# Patient Record
Sex: MALE | Race: WHITE | NOT HISPANIC OR LATINO | Employment: FULL TIME
[De-identification: names, ages, dates, MRNs, and addresses within clinical notes are randomized per-mention and may not be internally consistent; named-entity substitution may affect disease eponyms.]

---

## 2017-01-30 ENCOUNTER — SURGERY (OUTPATIENT)
Age: 57
End: 2017-01-30

## 2017-01-30 RX ADMIN — FENTANYL CITRATE 100 MCG: 50 INJECTION, SOLUTION INTRAMUSCULAR; INTRAVENOUS at 10:06

## 2017-01-30 RX ADMIN — MIDAZOLAM HYDROCHLORIDE 1 MG: 1 INJECTION, SOLUTION INTRAMUSCULAR; INTRAVENOUS at 10:08

## 2017-01-30 RX ADMIN — MIDAZOLAM HYDROCHLORIDE 2 MG: 1 INJECTION, SOLUTION INTRAMUSCULAR; INTRAVENOUS at 10:06

## 2017-05-25 ENCOUNTER — TELEPHONE (OUTPATIENT)
Dept: OTHER | Facility: CLINIC | Age: 57
End: 2017-05-25

## 2017-05-25 NOTE — TELEPHONE ENCOUNTER
Interventional Radiology     Received a request from [facility] to evaluate Daniele's left SC port a catheter in IR.     Daniele Koroma is a 57 y.o. Male with a h/o metastatic rectal cancer diagnosed in 6/2015 s/p abdominalperineal resection with end colostomy, RLL wedge resection for pulmonary nodules and chemotherapy. He last had a left SC port placed in surgery 9/2016 which has worked well.     He was seen in infusion clinic to have a maintenance flush. The RN was unable to get more than 5 mL of NS injected with difficulty, pain at the port site and unable to aspirate blood. The patient is done with active treatment early March and the last time the port was flushed was 4/12/17 without difficulty. The port was to stay in until follow up CT imaging was done which is next week.      Discussed with JOSTIN Lucero at [facility]. The plan is to have IR remove the port if unable to get it working. If the patient requires further chemotherapy as noted on next week's CT scan, can replace it in the future. Either way the port isn't working. I requested that Jazmine double check with the MD, Dr Cavazos and to fax an order for the above request or let IR know if something else is requested.     Thanks Natalya [facility] Interventional Radiology CNP ([phone])

## 2017-05-26 ENCOUNTER — APPOINTMENT (OUTPATIENT)
Dept: INTERVENTIONAL RADIOLOGY/VASCULAR | Facility: CLINIC | Age: 57
End: 2017-05-26
Attending: INTERNAL MEDICINE
Payer: COMMERCIAL

## 2017-05-26 ENCOUNTER — HOSPITAL ENCOUNTER (OUTPATIENT)
Facility: CLINIC | Age: 57
Discharge: HOME | End: 2017-05-26
Attending: RADIOLOGY | Admitting: RADIOLOGY
Payer: COMMERCIAL

## 2017-05-26 VITALS
HEART RATE: 60 BPM | TEMPERATURE: 97.8 F | DIASTOLIC BLOOD PRESSURE: 74 MMHG | SYSTOLIC BLOOD PRESSURE: 115 MMHG | RESPIRATION RATE: 16 BRPM | BODY MASS INDEX: 22.19 KG/M2 | WEIGHT: 155 LBS | OXYGEN SATURATION: 98 % | HEIGHT: 70 IN

## 2017-05-26 DIAGNOSIS — Z51.89 TREATMENT: ICD-10-CM

## 2017-05-26 LAB
APTT PPP: 33 SEC (ref 22–37)
ERYTHROCYTE [DISTWIDTH] IN BLOOD BY AUTOMATED COUNT: 12.5 % (ref 10–15)
HCT VFR BLD AUTO: 44.1 % (ref 40–53)
HGB BLD-MCNC: 14.8 G/DL (ref 13.3–17.7)
INR PPP: 1 (ref 0.86–1.14)
MCH RBC QN AUTO: 31.2 PG (ref 26.5–33)
MCHC RBC AUTO-ENTMCNC: 33.6 G/DL (ref 31.5–36.5)
MCV RBC AUTO: 93 FL (ref 78–100)
PLATELET # BLD AUTO: 171 10E9/L (ref 150–450)
RBC # BLD AUTO: 4.75 10E12/L (ref 4.4–5.9)
WBC # BLD AUTO: 4 10E9/L (ref 4–11)

## 2017-05-26 PROCEDURE — 85730 THROMBOPLASTIN TIME PARTIAL: CPT | Performed by: RADIOLOGY

## 2017-05-26 PROCEDURE — 25000125 ZZHC RX 250: Performed by: RADIOLOGY

## 2017-05-26 PROCEDURE — 85610 PROTHROMBIN TIME: CPT | Performed by: RADIOLOGY

## 2017-05-26 PROCEDURE — 27210905 ZZH KIT CR7

## 2017-05-26 PROCEDURE — 40000854 ZZH STATISTIC SIMPLE TUBE INSERTION/CHARGE, PORT, CATH, FISTULOGRAM

## 2017-05-26 PROCEDURE — 25000125 ZZHC RX 250

## 2017-05-26 PROCEDURE — 85027 COMPLETE CBC AUTOMATED: CPT | Performed by: RADIOLOGY

## 2017-05-26 PROCEDURE — 25000128 H RX IP 250 OP 636

## 2017-05-26 PROCEDURE — 27211193 ZZ H WOUND GLUE CR1

## 2017-05-26 PROCEDURE — 25000128 H RX IP 250 OP 636: Performed by: RADIOLOGY

## 2017-05-26 PROCEDURE — 99153 MOD SED SAME PHYS/QHP EA: CPT

## 2017-05-26 RX ORDER — FENTANYL CITRATE 50 UG/ML
25-50 INJECTION, SOLUTION INTRAMUSCULAR; INTRAVENOUS EVERY 5 MIN PRN
Status: DISCONTINUED | OUTPATIENT
Start: 2017-05-26 | End: 2017-05-26 | Stop reason: HOSPADM

## 2017-05-26 RX ORDER — LIDOCAINE 40 MG/G
CREAM TOPICAL
Status: DISCONTINUED | OUTPATIENT
Start: 2017-05-26 | End: 2017-05-26 | Stop reason: HOSPADM

## 2017-05-26 RX ORDER — HEPARIN SODIUM,PORCINE 10 UNIT/ML
5-10 VIAL (ML) INTRAVENOUS EVERY 24 HOURS
Status: DISCONTINUED | OUTPATIENT
Start: 2017-05-26 | End: 2017-05-26 | Stop reason: HOSPADM

## 2017-05-26 RX ORDER — HEPARIN SODIUM (PORCINE) LOCK FLUSH IV SOLN 100 UNIT/ML 100 UNIT/ML
5 SOLUTION INTRAVENOUS
Status: DISCONTINUED | OUTPATIENT
Start: 2017-05-26 | End: 2017-05-26 | Stop reason: HOSPADM

## 2017-05-26 RX ORDER — FENTANYL CITRATE 50 UG/ML
INJECTION, SOLUTION INTRAMUSCULAR; INTRAVENOUS
Status: COMPLETED
Start: 2017-05-26 | End: 2017-05-26

## 2017-05-26 RX ORDER — LIDOCAINE HYDROCHLORIDE 10 MG/ML
1-30 INJECTION, SOLUTION EPIDURAL; INFILTRATION; INTRACAUDAL; PERINEURAL
Status: COMPLETED | OUTPATIENT
Start: 2017-05-26 | End: 2017-05-26

## 2017-05-26 RX ORDER — IOPAMIDOL 612 MG/ML
50 INJECTION, SOLUTION INTRAVASCULAR ONCE
Status: COMPLETED | OUTPATIENT
Start: 2017-05-26 | End: 2017-05-26

## 2017-05-26 RX ORDER — FLUMAZENIL 0.1 MG/ML
0.2 INJECTION, SOLUTION INTRAVENOUS
Status: DISCONTINUED | OUTPATIENT
Start: 2017-05-26 | End: 2017-05-26 | Stop reason: HOSPADM

## 2017-05-26 RX ORDER — NALOXONE HYDROCHLORIDE 0.4 MG/ML
.1-.4 INJECTION, SOLUTION INTRAMUSCULAR; INTRAVENOUS; SUBCUTANEOUS
Status: DISCONTINUED | OUTPATIENT
Start: 2017-05-26 | End: 2017-05-26 | Stop reason: HOSPADM

## 2017-05-26 RX ORDER — HEPARIN SODIUM,PORCINE 10 UNIT/ML
5-10 VIAL (ML) INTRAVENOUS
Status: DISCONTINUED | OUTPATIENT
Start: 2017-05-26 | End: 2017-05-26 | Stop reason: HOSPADM

## 2017-05-26 RX ORDER — CEFAZOLIN SODIUM 2 G/100ML
2 INJECTION, SOLUTION INTRAVENOUS
Status: COMPLETED | OUTPATIENT
Start: 2017-05-26 | End: 2017-05-26

## 2017-05-26 RX ADMIN — MIDAZOLAM HYDROCHLORIDE 1 MG: 1 INJECTION, SOLUTION INTRAMUSCULAR; INTRAVENOUS at 08:21

## 2017-05-26 RX ADMIN — FENTANYL CITRATE 50 MCG: 50 INJECTION, SOLUTION INTRAMUSCULAR; INTRAVENOUS at 08:20

## 2017-05-26 RX ADMIN — FENTANYL CITRATE 50 MCG: 50 INJECTION, SOLUTION INTRAMUSCULAR; INTRAVENOUS at 08:26

## 2017-05-26 RX ADMIN — IOPAMIDOL 15 ML: 612 INJECTION, SOLUTION INTRAVENOUS at 08:53

## 2017-05-26 RX ADMIN — CEFAZOLIN SODIUM 2 G: 2 INJECTION, SOLUTION INTRAVENOUS at 08:15

## 2017-05-26 RX ADMIN — LIDOCAINE HYDROCHLORIDE 90 MG: 10 INJECTION, SOLUTION EPIDURAL; INFILTRATION; INTRACAUDAL; PERINEURAL at 08:42

## 2017-05-26 NOTE — PROGRESS NOTES
Pt here for port dye study or removal.  Port accessed now with easy pain free flush and blood return.  Will notify IR dept.  Lab work pending.

## 2017-05-26 NOTE — PROGRESS NOTES
"Return to CS at 0905, alert, VSS.  Dressing to left chest CDI.  Reports no pain.  D/c instructions given, pt states understanding.  Several discussions pre and post procedure about the importance of not driving today.  D.c  Via WC   Step daughter here to  pt and was also informed pt should not drive for 24 hours.   Pt remains very evasive about driving to his cabin today.  \"Oh, I'll see how I feel later\".   "

## 2017-05-26 NOTE — IP AVS SNAPSHOT
[place] [address]    [place] [address]    Phone:  [phone]                                       After Visit Summary   5/26/2017    Daniele Koroma    MRN: [id number]           After Visit Summary Signature Page     I have received my discharge instructions, and my questions have been answered. I have discussed any challenges I see with this plan with the nurse or doctor.    ..........................................................................................................................................  Patient/Patient Representative Signature      ..........................................................................................................................................  Patient Representative Print Name and Relationship to Patient    ..................................................               ................................................  Date                                            Time    ..........................................................................................................................................  Reviewed by Signature/Title    ...................................................              ..............................................  Date                                                            Time

## 2017-05-26 NOTE — IP AVS SNAPSHOT
MRN:[id number]                      After Visit Summary   5/26/2017    Daniele Koroma    MRN: [id number]           Visit Information        Department      5/26/2017  6:28 AM [facility]          Review of your medicines      UNREVIEWED medicines. 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)        Dose:  1 tablet   Take 1 tablet by mouth every 6 hours as needed for other (Moderate to Severe Pain)   Quantity:  8 tablet   Refills:  0       IBUPROFEN PO        Dose:  400 mg   Take 400 mg by mouth every 6 hours as needed for moderate pain   Refills:  0       KEFLEX PO        Dose:  1 tablet   Take 1 tablet by mouth 2 times daily   Refills:  0         CONTINUE these medicines which have NOT CHANGED        Dose / Directions    order for DME   Used for:  Rectal cancer metastasised to intrapelvic lymph node, Colostomy in place (H)        Equipment being ordered: Other: ostomy supplies 1 or 2 piece barrier and pouch, deodorizing drops and spray; ostomy rings, ostomy paste Treatment Diagnosis: Rectal cancer   Quantity:  1 Device   Refills:  3                Protect others around you: Learn how to safely use, store and throw away your medicines at www.disposemymeds.org.         Follow-ups after your visit         Care Instructions        Further instructions from your care team         Port Removal Discharge Instructions     After you go home:      Have an adult stay with you for the first 6 hours    You may resume your normal diet       For 24 hours - due to the sedation you received:    Relax and take it easy    Do NOT make any important or legal decisions    Do NOT drive or operate machines at home or at work    Do NOT drink alcohol    Care of Puncture Site:      Keep the dressings on your site clean & dry for 3 days.  "Change it only if it gets wet or dirty.    You may shower after the dressing comes off in 3 days    Do not remove the small white strips of tape, if present. Allow them to fall off on their own.     You may cover the wound with a bandaid after the dressing is removed if needed for comfort      Activity       Avoid heavy lifting (greater than 10 pounds) or the overuse of your shoulder for 3 days    Bleeding:      If you start bleeding from the incision site in your chest or have swelling in your neck, sit down and press on the site for 5-10 minutes.     If bleeding has not stopped after 10 minutes, call your provider.        Call 911 right away if you have heavy bleeding or bleeding that does not stop.      Medicines:      You may resume all medications    Resume your Warfarin/Coumadin at your regular dose today. Follow up with your provider to have your INR rechecked    Resume your Platelet Inhibitors and Aspirin tomorrow at your regular dose    For minor pain, you may take Acetaminophen (Tylenol) or Ibuprofen (Advil)          Call the provider who ordered this procedure if:      You have swelling in your neck or over your port site    The incision area is red, swollen, hot or tender    You have chills or a fever greater than 101 F (38 C)    Any questions or concerns    Call  911 or go to the Emergency Room if you have:      Severe chest pain or trouble breathing    Bleeding that you cannot control    If you have questions call:          [facility] Radiology Dept @ [phone]    The provider who performed your procedure was __Dr Givens_______________.     Additional Information About Your Visit        MyChart Information     [employer] lets you send messages to your doctor, view your test results, renew your prescriptions, schedule appointments and more. To sign up, go to [email or web address] . Click on \"Log in\" on the left side of the screen, which will take you to the Welcome page. Then click on \"Sign " "up Now\" on the right side of the page.     You will be asked to enter the access code listed below, as well as some personal information. Please follow the directions to create your username and password.     Your access code is: [email or web address]  Expires: 2017  9:12 AM     Your access code will  in 90 days. If you need help or a new code, please call your [facility] clinic or [phone].        Care EveryWhere ID     This is your Care EveryWhere ID. This could be used by other organizations to access your [facility] medical records  [id number]        Your Vitals Were     Blood Pressure Pulse Temperature Respirations Height Weight    115/74 74 97.8  F (36.6  C) (Oral) 16 1.778 m (5' 10\") 70.3 kg (155 lb)    Pulse Oximetry BMI (Body Mass Index)                98% 22.24 kg/m2           Primary Care Provider Office Phone # Fax #    Jennifer Rodriguez [phone]      Thank you!     Thank you for choosing [facility] for your care. Our goal is always to provide you with excellent care. Hearing back from our patients is one way we can continue to improve our services. Please take a few minutes to complete the written survey that you may receive in the mail after you visit with us. Thank you!             Medication List: This is a list of all your medications and when to take them. Check marks below indicate your daily home schedule. Keep this list as a reference.      Medications           Morning Afternoon Evening Bedtime As Needed    HYDROcodone-acetaminophen 5-325 MG per tablet   Commonly known as:  NORCO   Take 1 tablet by mouth every 6 hours as needed for other (Moderate to Severe Pain)                                IBUPROFEN PO   Take 400 mg by mouth every 6 hours as needed for moderate pain                                KEFLEX PO   Take 1 tablet by mouth 2 times daily                                order for DME   Equipment being ordered: Other: ostomy supplies 1 or 2 piece barrier " and pouch, deodorizing drops and spray; ostomy rings, ostomy paste Treatment Diagnosis: Rectal cancer

## 2017-05-26 NOTE — PROGRESS NOTES
Interventional Radiology Intra-procedural Nursing Note    Patient Name: Daniele Koroma  Medical Record Number: [id number]  Today's Date: May 26, 2017    Start Time:  0755  End of procedure time:  0835  Procedure:  Portacath check and Portacath removal  Report given to:  [facility] RN  Time pt departs:   0855  :  N/A    Other Notes:  Patient into IR#3 at 0750.  Consent signed.  Portacath checked by MD Silvino and decision made to remove.  2 gm Ancef started.   Patient prepped and draped in supine position with 2% Chlorhexidine.  VSS.  Monitor reads  NSR.  O2 sats 97% on RA.  Portacath removed by MD Silvino.  Patient received 2mg Versed and 100mcg Fentanyl for sedation during procedure.  Slight redness noted at portacath site post-procedure from tegaderm.  Otherwise site /D/I.    Patient tolerated procedure well, transported back to care suites in stable condition by IR RN and bedside handoff report perfomed.      Jessie Saucedo , RN, BSN, CCRN

## 2017-05-26 NOTE — DISCHARGE INSTRUCTIONS
Port Removal Discharge Instructions     After you go home:      Have an adult stay with you for the first 6 hours    You may resume your normal diet       For 24 hours - due to the sedation you received:    Relax and take it easy    Do NOT make any important or legal decisions    Do NOT drive or operate machines at home or at work    Do NOT drink alcohol    Care of Puncture Site:      Keep the dressings on your site clean & dry for 3 days. Change it only if it gets wet or dirty.    You may shower after the dressing comes off in 3 days    Do not remove the small white strips of tape, if present. Allow them to fall off on their own.     You may cover the wound with a bandaid after the dressing is removed if needed for comfort      Activity       Avoid heavy lifting (greater than 10 pounds) or the overuse of your shoulder for 3 days    Bleeding:      If you start bleeding from the incision site in your chest or have swelling in your neck, sit down and press on the site for 5-10 minutes.     If bleeding has not stopped after 10 minutes, call your provider.        Call 911 right away if you have heavy bleeding or bleeding that does not stop.      Medicines:      You may resume all medications    Resume your Warfarin/Coumadin at your regular dose today. Follow up with your provider to have your INR rechecked    Resume your Platelet Inhibitors and Aspirin tomorrow at your regular dose    For minor pain, you may take Acetaminophen (Tylenol) or Ibuprofen (Advil)          Call the provider who ordered this procedure if:      You have swelling in your neck or over your port site    The incision area is red, swollen, hot or tender    You have chills or a fever greater than 101 F (38 C)    Any questions or concerns    Call  911 or go to the Emergency Room if you have:      Severe chest pain or trouble breathing    Bleeding that you cannot control    If you have questions call:          [facility] Radiology Dept @  [phone]    The provider who performed your procedure was __Dr Givens_______________.

## 2017-06-09 ENCOUNTER — HOSPITAL ENCOUNTER (OUTPATIENT)
Facility: CLINIC | Age: 57
Discharge: HOME | End: 2017-06-09
Attending: THORACIC SURGERY (CARDIOTHORACIC VASCULAR SURGERY) | Admitting: THORACIC SURGERY (CARDIOTHORACIC VASCULAR SURGERY)
Payer: COMMERCIAL

## 2017-06-09 ENCOUNTER — SURGERY (OUTPATIENT)
Age: 57
End: 2017-06-09

## 2017-06-09 ENCOUNTER — ANESTHESIA (OUTPATIENT)
Dept: SURGERY | Facility: CLINIC | Age: 57
End: 2017-06-09
Payer: COMMERCIAL

## 2017-06-09 ENCOUNTER — APPOINTMENT (OUTPATIENT)
Dept: GENERAL RADIOLOGY | Facility: CLINIC | Age: 57
End: 2017-06-09
Attending: THORACIC SURGERY (CARDIOTHORACIC VASCULAR SURGERY)
Payer: COMMERCIAL

## 2017-06-09 ENCOUNTER — ANESTHESIA EVENT (OUTPATIENT)
Dept: SURGERY | Facility: CLINIC | Age: 57
End: 2017-06-09
Payer: COMMERCIAL

## 2017-06-09 VITALS
TEMPERATURE: 96.9 F | HEIGHT: 70 IN | RESPIRATION RATE: 16 BRPM | SYSTOLIC BLOOD PRESSURE: 114 MMHG | DIASTOLIC BLOOD PRESSURE: 83 MMHG | WEIGHT: 158 LBS | OXYGEN SATURATION: 97 % | BODY MASS INDEX: 22.62 KG/M2

## 2017-06-09 PROCEDURE — 40000170 ZZH STATISTIC PRE-PROCEDURE ASSESSMENT II: Performed by: THORACIC SURGERY (CARDIOTHORACIC VASCULAR SURGERY)

## 2017-06-09 PROCEDURE — 27210794 ZZH OR GENERAL SUPPLY STERILE: Performed by: THORACIC SURGERY (CARDIOTHORACIC VASCULAR SURGERY)

## 2017-06-09 PROCEDURE — 37000008 ZZH ANESTHESIA TECHNICAL FEE, 1ST 30 MIN: Performed by: THORACIC SURGERY (CARDIOTHORACIC VASCULAR SURGERY)

## 2017-06-09 PROCEDURE — 40000985 XR CHEST PORT 1 VW

## 2017-06-09 PROCEDURE — 25000128 H RX IP 250 OP 636: Performed by: THORACIC SURGERY (CARDIOTHORACIC VASCULAR SURGERY)

## 2017-06-09 PROCEDURE — C1788 PORT, INDWELLING, IMP: HCPCS | Performed by: THORACIC SURGERY (CARDIOTHORACIC VASCULAR SURGERY)

## 2017-06-09 PROCEDURE — 25000125 ZZHC RX 250: Performed by: THORACIC SURGERY (CARDIOTHORACIC VASCULAR SURGERY)

## 2017-06-09 PROCEDURE — 37000009 ZZH ANESTHESIA TECHNICAL FEE, EACH ADDTL 15 MIN: Performed by: THORACIC SURGERY (CARDIOTHORACIC VASCULAR SURGERY)

## 2017-06-09 PROCEDURE — 71000012 ZZH RECOVERY PHASE 1 LEVEL 1 FIRST HR: Performed by: THORACIC SURGERY (CARDIOTHORACIC VASCULAR SURGERY)

## 2017-06-09 PROCEDURE — 36000052 ZZH SURGERY LEVEL 2 EA 15 ADDTL MIN: Performed by: THORACIC SURGERY (CARDIOTHORACIC VASCULAR SURGERY)

## 2017-06-09 PROCEDURE — 25000125 ZZHC RX 250: Performed by: NURSE ANESTHETIST, CERTIFIED REGISTERED

## 2017-06-09 PROCEDURE — 25000128 H RX IP 250 OP 636: Performed by: NURSE ANESTHETIST, CERTIFIED REGISTERED

## 2017-06-09 PROCEDURE — 27210995 ZZH RX 272: Performed by: THORACIC SURGERY (CARDIOTHORACIC VASCULAR SURGERY)

## 2017-06-09 PROCEDURE — 71000027 ZZH RECOVERY PHASE 2 EACH 15 MINS: Performed by: THORACIC SURGERY (CARDIOTHORACIC VASCULAR SURGERY)

## 2017-06-09 PROCEDURE — 36000050 ZZH SURGERY LEVEL 2 1ST 30 MIN: Performed by: THORACIC SURGERY (CARDIOTHORACIC VASCULAR SURGERY)

## 2017-06-09 DEVICE — CATH PORT POWERPORT CLEARVUE ISP 8FR 5608062
Type: IMPLANTABLE DEVICE | Site: CHEST | Status: NON-FUNCTIONAL
Removed: 2018-07-27

## 2017-06-09 RX ORDER — SODIUM CHLORIDE, SODIUM LACTATE, POTASSIUM CHLORIDE, CALCIUM CHLORIDE 600; 310; 30; 20 MG/100ML; MG/100ML; MG/100ML; MG/100ML
INJECTION, SOLUTION INTRAVENOUS CONTINUOUS PRN
Status: DISCONTINUED | OUTPATIENT
Start: 2017-06-09 | End: 2017-06-09

## 2017-06-09 RX ORDER — FENTANYL CITRATE 50 UG/ML
25-50 INJECTION, SOLUTION INTRAMUSCULAR; INTRAVENOUS EVERY 5 MIN PRN
Status: DISCONTINUED | OUTPATIENT
Start: 2017-06-09 | End: 2017-06-09 | Stop reason: HOSPADM

## 2017-06-09 RX ORDER — ACETAMINOPHEN 325 MG/1
650 TABLET ORAL
Status: DISCONTINUED | OUTPATIENT
Start: 2017-06-09 | End: 2017-06-09 | Stop reason: HOSPADM

## 2017-06-09 RX ORDER — NALOXONE HYDROCHLORIDE 0.4 MG/ML
.1-.4 INJECTION, SOLUTION INTRAMUSCULAR; INTRAVENOUS; SUBCUTANEOUS
Status: DISCONTINUED | OUTPATIENT
Start: 2017-06-09 | End: 2017-06-09 | Stop reason: HOSPADM

## 2017-06-09 RX ORDER — CEFAZOLIN SODIUM 1 G/3ML
INJECTION, POWDER, FOR SOLUTION INTRAMUSCULAR; INTRAVENOUS PRN
Status: DISCONTINUED | OUTPATIENT
Start: 2017-06-09 | End: 2017-06-09

## 2017-06-09 RX ORDER — HEPARIN SODIUM 1000 [USP'U]/ML
INJECTION, SOLUTION INTRAVENOUS; SUBCUTANEOUS
Status: DISCONTINUED
Start: 2017-06-09 | End: 2017-06-09 | Stop reason: HOSPADM

## 2017-06-09 RX ORDER — DEXAMETHASONE SODIUM PHOSPHATE 4 MG/ML
INJECTION, SOLUTION INTRA-ARTICULAR; INTRALESIONAL; INTRAMUSCULAR; INTRAVENOUS; SOFT TISSUE PRN
Status: DISCONTINUED | OUTPATIENT
Start: 2017-06-09 | End: 2017-06-09

## 2017-06-09 RX ORDER — HEPARIN SODIUM (PORCINE) LOCK FLUSH IV SOLN 100 UNIT/ML 100 UNIT/ML
SOLUTION INTRAVENOUS PRN
Status: DISCONTINUED | OUTPATIENT
Start: 2017-06-09 | End: 2017-06-09 | Stop reason: HOSPADM

## 2017-06-09 RX ORDER — MEPERIDINE HYDROCHLORIDE 25 MG/ML
12.5 INJECTION INTRAMUSCULAR; INTRAVENOUS; SUBCUTANEOUS
Status: DISCONTINUED | OUTPATIENT
Start: 2017-06-09 | End: 2017-06-09 | Stop reason: HOSPADM

## 2017-06-09 RX ORDER — FENTANYL CITRATE 50 UG/ML
25-50 INJECTION, SOLUTION INTRAMUSCULAR; INTRAVENOUS
Status: DISCONTINUED | OUTPATIENT
Start: 2017-06-09 | End: 2017-06-09 | Stop reason: HOSPADM

## 2017-06-09 RX ORDER — LIDOCAINE HYDROCHLORIDE 20 MG/ML
INJECTION, SOLUTION INFILTRATION; PERINEURAL PRN
Status: DISCONTINUED | OUTPATIENT
Start: 2017-06-09 | End: 2017-06-09

## 2017-06-09 RX ORDER — LIDOCAINE HYDROCHLORIDE 10 MG/ML
INJECTION, SOLUTION INFILTRATION; PERINEURAL PRN
Status: DISCONTINUED | OUTPATIENT
Start: 2017-06-09 | End: 2017-06-09 | Stop reason: HOSPADM

## 2017-06-09 RX ORDER — ONDANSETRON 4 MG/1
4 TABLET, ORALLY DISINTEGRATING ORAL EVERY 30 MIN PRN
Status: DISCONTINUED | OUTPATIENT
Start: 2017-06-09 | End: 2017-06-09 | Stop reason: HOSPADM

## 2017-06-09 RX ORDER — ONDANSETRON 2 MG/ML
INJECTION INTRAMUSCULAR; INTRAVENOUS PRN
Status: DISCONTINUED | OUTPATIENT
Start: 2017-06-09 | End: 2017-06-09

## 2017-06-09 RX ORDER — PROPOFOL 10 MG/ML
INJECTION, EMULSION INTRAVENOUS PRN
Status: DISCONTINUED | OUTPATIENT
Start: 2017-06-09 | End: 2017-06-09

## 2017-06-09 RX ORDER — ONDANSETRON 2 MG/ML
4 INJECTION INTRAMUSCULAR; INTRAVENOUS EVERY 30 MIN PRN
Status: DISCONTINUED | OUTPATIENT
Start: 2017-06-09 | End: 2017-06-09 | Stop reason: HOSPADM

## 2017-06-09 RX ORDER — PHYSOSTIGMINE SALICYLATE 1 MG/ML
1.2 INJECTION INTRAVENOUS
Status: DISCONTINUED | OUTPATIENT
Start: 2017-06-09 | End: 2017-06-09 | Stop reason: HOSPADM

## 2017-06-09 RX ORDER — SODIUM CHLORIDE, SODIUM LACTATE, POTASSIUM CHLORIDE, CALCIUM CHLORIDE 600; 310; 30; 20 MG/100ML; MG/100ML; MG/100ML; MG/100ML
INJECTION, SOLUTION INTRAVENOUS CONTINUOUS
Status: DISCONTINUED | OUTPATIENT
Start: 2017-06-09 | End: 2017-06-09 | Stop reason: HOSPADM

## 2017-06-09 RX ORDER — KETOROLAC TROMETHAMINE 30 MG/ML
30 INJECTION, SOLUTION INTRAMUSCULAR; INTRAVENOUS ONCE
Status: DISCONTINUED | OUTPATIENT
Start: 2017-06-09 | End: 2017-06-09 | Stop reason: HOSPADM

## 2017-06-09 RX ORDER — FENTANYL CITRATE 50 UG/ML
INJECTION, SOLUTION INTRAMUSCULAR; INTRAVENOUS PRN
Status: DISCONTINUED | OUTPATIENT
Start: 2017-06-09 | End: 2017-06-09

## 2017-06-09 RX ORDER — LIDOCAINE HYDROCHLORIDE 10 MG/ML
INJECTION, SOLUTION INFILTRATION; PERINEURAL
Status: DISCONTINUED
Start: 2017-06-09 | End: 2017-06-09 | Stop reason: HOSPADM

## 2017-06-09 RX ADMIN — HEPARIN SODIUM (PORCINE) LOCK FLUSH IV SOLN 100 UNIT/ML 10 ML: 100 SOLUTION at 11:18

## 2017-06-09 RX ADMIN — CEFAZOLIN 2 G: 1 INJECTION, POWDER, FOR SOLUTION INTRAMUSCULAR; INTRAVENOUS at 10:58

## 2017-06-09 RX ADMIN — SODIUM CHLORIDE, POTASSIUM CHLORIDE, SODIUM LACTATE AND CALCIUM CHLORIDE: 600; 310; 30; 20 INJECTION, SOLUTION INTRAVENOUS at 10:54

## 2017-06-09 RX ADMIN — FENTANYL CITRATE 50 MCG: 50 INJECTION, SOLUTION INTRAMUSCULAR; INTRAVENOUS at 10:54

## 2017-06-09 RX ADMIN — FENTANYL CITRATE 50 MCG: 50 INJECTION, SOLUTION INTRAMUSCULAR; INTRAVENOUS at 10:59

## 2017-06-09 RX ADMIN — DEXMEDETOMIDINE HYDROCHLORIDE 8 MCG: 100 INJECTION, SOLUTION INTRAVENOUS at 11:00

## 2017-06-09 RX ADMIN — HEPARIN SODIUM 10 ML: 1000 INJECTION, SOLUTION INTRAVENOUS; SUBCUTANEOUS at 11:18

## 2017-06-09 RX ADMIN — LIDOCAINE HYDROCHLORIDE 40 MG: 20 INJECTION, SOLUTION INFILTRATION; PERINEURAL at 10:55

## 2017-06-09 RX ADMIN — ONDANSETRON 4 MG: 2 INJECTION INTRAMUSCULAR; INTRAVENOUS at 10:57

## 2017-06-09 RX ADMIN — PROPOFOL 20 MG: 10 INJECTION, EMULSION INTRAVENOUS at 10:59

## 2017-06-09 RX ADMIN — PROPOFOL 10 MG: 10 INJECTION, EMULSION INTRAVENOUS at 11:10

## 2017-06-09 RX ADMIN — Medication 2 APPLICATOR: at 11:26

## 2017-06-09 RX ADMIN — DEXAMETHASONE SODIUM PHOSPHATE 4 MG: 4 INJECTION, SOLUTION INTRA-ARTICULAR; INTRALESIONAL; INTRAMUSCULAR; INTRAVENOUS; SOFT TISSUE at 10:57

## 2017-06-09 RX ADMIN — MIDAZOLAM HYDROCHLORIDE 2 MG: 1 INJECTION, SOLUTION INTRAMUSCULAR; INTRAVENOUS at 10:54

## 2017-06-09 RX ADMIN — LIDOCAINE HYDROCHLORIDE 10 ML: 10 INJECTION, SOLUTION INFILTRATION; PERINEURAL at 11:18

## 2017-06-09 RX ADMIN — PROPOFOL 30 MG: 10 INJECTION, EMULSION INTRAVENOUS at 10:55

## 2017-06-09 NOTE — ANESTHESIA CARE TRANSFER NOTE
Patient: Daniele Koroma    Procedure(s):  PORT PLACEMENT WITH FLAT PLATE XRAY  - Wound Class: I-Clean    Diagnosis: rectal cancer  Diagnosis Additional Information: No value filed.    Anesthesia Type:   MAC     Note:  Airway :Room Air  Patient transferred to:PACU  Comments: Transferred to PACU, spontaneous respirations. Room air to PACU, connected to wall O2 in PACU. All monitors and alarms on and functioning, clinically stable vital signs. Report given to PACU RN and questions answered.       Vitals: (Last set prior to Anesthesia Care Transfer)    CRNA VITALS  6/9/2017 1059 - 6/9/2017 1134      6/9/2017             Resp Rate (set): 10                Electronically Signed By: NOLAN Simmons CRNA  June 9, 2017  11:34 AM

## 2017-06-09 NOTE — IP AVS SNAPSHOT
MRN:[id number]                      After Visit Summary   6/9/2017    Daniele Koroma    MRN: [id number]           Thank you!     Thank you for choosing [facility] for your care. Our goal is always to provide you with excellent care. Hearing back from our patients is one way we can continue to improve our services. Please take a few minutes to complete the written survey that you may receive in the mail after you visit with us. Thank you!        Patient Information     Date Of Birth          1960        About your hospital stay     You were admitted on:  June 9, 2017 You last received care in the:  [facility] Same Day Surgery    You were discharged on:  June 9, 2017       Who to Call     For medical emergencies, please call 911.  For non-urgent questions about your medical care, please call your primary care provider or clinic, [phone]  For questions related to your surgery, please call your surgery clinic        Attending Provider     Provider Specialty    Fritz Pierce MD Thoracic Diseases       Primary Care Provider Office Phone # Fax #    Jennifer Doris Rodriguez [phone]      Further instructions from your care team       Same Day Surgery Discharge Instructions for  Sedation and General Anesthesia       It's not unusual to feel dizzy, light-headed or faint for up to 24 hours after surgery or while taking pain medication.  If you have these symptoms: sit for a few minutes before standing and have someone assist you when you get up to walk or use the bathroom.      You should rest and relax for the next 24 hours. We recommend you make arrangements to have an adult stay with you for at least 24 hours after your discharge.  Avoid hazardous and strenuous activity.      DO NOT DRIVE any vehicle or operate mechanical equipment for 24 hours following the end of your surgery.  Even though you may feel normal, your reactions may be affected by the  medication you have received.      Do not drink alcoholic beverages for 24 hours following surgery.       Slowly progress to your regular diet as you feel able. It's not unusual to feel nauseated and/or vomit after receiving anesthesia.  If you develop these symptoms, drink clear liquids (apple juice, ginger ale, broth, 7-up, etc. ) until you feel better.  If your nausea and vomiting persists for 24 hours, please notify your surgeon.        All narcotic pain medications, along with inactivity and anesthesia, can cause constipation. Drinking plenty of liquids and increasing fiber intake will help.      For any questions of a medical nature, call your surgeon.      Do not make important decisions for 24 hours.      If you had general anesthesia, you may have a sore throat for a couple of days related to the breathing tube used during surgery.  You may use Cepacol lozenges to help with this discomfort.  If it worsens or if you develop a fever, contact your surgeon.       If you feel your pain is not well managed with the pain medications prescribed by your surgeon, please contact your surgeon's office to let them know so they can address your concerns.     Dermabond   General Care:  Keep the wound clean and dry however, you may shower or bathe tomorrow as usual, but do not use soaps, lotions, or ointments on the wound area. Do not scrub the wound. After bathing, pat the wound dry with a soft towel.  Do not scratch, rub, or pick at the film. Do not place tape directly over the  film.  Do not apply liquids (such as peroxide), ointments, or creams to the wound while the strips or film are in place.  Most  wounds heal without problems. However, an infection sometimes occurs despite proper treatment. Therefore, watch for the signs of infection listed below.  FOLLOW UP as directed by the doctor or our staff. The  film will fall off naturally in 5 to 10 days.  CONTACT YOU SURGEON if any of the following occur:  Signs of  "infection:  Fever of 100.4 F (38 C) or higher, or as directed by your healthcare provider  Increasing pain in the wound  Increasing redness or swelling  Pus coming from the wound  Wound bleeds more than a small amount or bleeding doesn t stop  Wound edges come apart      Discharge Instructions for Power Port Placement      General Instructions:    You will be given a card with information about your port.  You should carry this with you in your wallet/billfold. It provides information to clinicians about your port.  You should also carry the card in case your port triggers any security devices.     Activity:    Limit your activity for the first few days after your port is placed    Incision Care:     Unless otherwise directed by your surgeon, the dressing may removed tomorrow.      If a topical skin adhesive was used to close incision, you may shower tomorrow. Do not use soaps, lotions, or ointments on the wound area. Do not scrub the wound. After bathing, pat the wound dry with a soft towel.  Do not scratch, rub, or pick at the strips or film. Do not place tape directly over the strips or film.  Do not apply liquids (such as peroxide), ointments, or creams to the wound while the strips or film are in place.    Call your surgeon if you have:     Redness, swelling or drainage from incision     A fever of 101 F or greater.      Nausea or vomiting.     Pain that is not controlled by medications and/or rest.     Questions or concerns.                Pending Results     Date and Time Order Name Status Description    6/9/2017 1114 XR Chest Port 1 View In process             Admission Information     Date & Time Provider Department Dept. Phone    6/9/2017 Fritz Pierce MD [facility] Same Day Surgery [phone]      Your Vitals Were     Blood Pressure Temperature Respirations Height Weight Pulse Oximetry    106/77 96.9  F (36.1  C) (Temporal) 16 1.778 m (5' 10\") 71.7 kg (158 lb) 97%    BMI (Body Mass " "Index)                   22.67 kg/m2           [employer] Information     [employer] lets you send messages to your doctor, view your test results, renew your prescriptions, schedule appointments and more. To sign up, go to [email or web address] . Click on \"Log in\" on the left side of the screen, which will take you to the Welcome page. Then click on \"Sign up Now\" on the right side of the page.     You will be asked to enter the access code listed below, as well as some personal information. Please follow the directions to create your username and password.     Your access code is: [email or web address]  Expires: 2017  9:12 AM     Your access code will  in 90 days. If you need help or a new code, please call your [place] clinic or [phone].        Care EveryWhere ID     This is your Care EveryWhere ID. This could be used by other organizations to access your [place] medical records  [id number]           Review of your medicines      CONTINUE these medicines which have NOT CHANGED        Dose / Directions    IBUPROFEN PO        Dose:  400 mg   Take 400 mg by mouth every 6 hours as needed for moderate pain   Refills:  0       order for DME   Used for:  Rectal cancer metastasised to intrapelvic lymph node, Colostomy in place (H)        Equipment being ordered: Other: ostomy supplies 1 or 2 piece barrier and pouch, deodorizing drops and spray; ostomy rings, ostomy paste Treatment Diagnosis: Rectal cancer   Quantity:  1 Device   Refills:  3                Protect others around you: Learn how to safely use, store and throw away your medicines at www.disposemymeds.org.             Medication List: This is a list of all your medications and when to take them. Check marks below indicate your daily home schedule. Keep this list as a reference.      Medications           Morning Afternoon Evening Bedtime As Needed    IBUPROFEN PO   Take 400 mg by mouth every 6 hours as needed for moderate pain                             "    order for DME   Equipment being ordered: Other: ostomy supplies 1 or 2 piece barrier and pouch, deodorizing drops and spray; ostomy rings, ostomy paste Treatment Diagnosis: Rectal cancer

## 2017-06-09 NOTE — DISCHARGE INSTRUCTIONS
Same Day Surgery Discharge Instructions for  Sedation and General Anesthesia       It's not unusual to feel dizzy, light-headed or faint for up to 24 hours after surgery or while taking pain medication.  If you have these symptoms: sit for a few minutes before standing and have someone assist you when you get up to walk or use the bathroom.      You should rest and relax for the next 24 hours. We recommend you make arrangements to have an adult stay with you for at least 24 hours after your discharge.  Avoid hazardous and strenuous activity.      DO NOT DRIVE any vehicle or operate mechanical equipment for 24 hours following the end of your surgery.  Even though you may feel normal, your reactions may be affected by the medication you have received.      Do not drink alcoholic beverages for 24 hours following surgery.       Slowly progress to your regular diet as you feel able. It's not unusual to feel nauseated and/or vomit after receiving anesthesia.  If you develop these symptoms, drink clear liquids (apple juice, ginger ale, broth, 7-up, etc. ) until you feel better.  If your nausea and vomiting persists for 24 hours, please notify your surgeon.        All narcotic pain medications, along with inactivity and anesthesia, can cause constipation. Drinking plenty of liquids and increasing fiber intake will help.      For any questions of a medical nature, call your surgeon.      Do not make important decisions for 24 hours.      If you had general anesthesia, you may have a sore throat for a couple of days related to the breathing tube used during surgery.  You may use Cepacol lozenges to help with this discomfort.  If it worsens or if you develop a fever, contact your surgeon.       If you feel your pain is not well managed with the pain medications prescribed by your surgeon, please contact your surgeon's office to let them know so they can address your concerns.     Dermabond   General Care:  Keep the wound clean  and dry however, you may shower or bathe tomorrow as usual, but do not use soaps, lotions, or ointments on the wound area. Do not scrub the wound. After bathing, pat the wound dry with a soft towel.  Do not scratch, rub, or pick at the film. Do not place tape directly over the  film.  Do not apply liquids (such as peroxide), ointments, or creams to the wound while the strips or film are in place.  Most  wounds heal without problems. However, an infection sometimes occurs despite proper treatment. Therefore, watch for the signs of infection listed below.  FOLLOW UP as directed by the doctor or our staff. The  film will fall off naturally in 5 to 10 days.  CONTACT YOU SURGEON if any of the following occur:  Signs of infection:  Fever of 100.4 F (38 C) or higher, or as directed by your healthcare provider  Increasing pain in the wound  Increasing redness or swelling  Pus coming from the wound  Wound bleeds more than a small amount or bleeding doesn t stop  Wound edges come apart      Discharge Instructions for Power Port Placement      General Instructions:    You will be given a card with information about your port.  You should carry this with you in your wallet/billfold. It provides information to clinicians about your port.  You should also carry the card in case your port triggers any security devices.     Activity:    Limit your activity for the first few days after your port is placed    Incision Care:     Unless otherwise directed by your surgeon, the dressing may removed tomorrow.      If a topical skin adhesive was used to close incision, you may shower tomorrow. Do not use soaps, lotions, or ointments on the wound area. Do not scrub the wound. After bathing, pat the wound dry with a soft towel.  Do not scratch, rub, or pick at the strips or film. Do not place tape directly over the strips or film.  Do not apply liquids (such as peroxide), ointments, or creams to the wound while the strips or film are in  place.    Call your surgeon if you have:     Redness, swelling or drainage from incision     A fever of 101 F or greater.      Nausea or vomiting.     Pain that is not controlled by medications and/or rest.     Questions or concerns.

## 2017-06-09 NOTE — IP AVS SNAPSHOT
[facility] Same Day Surgery    [address]    [place] [address]    Phone:  [phone]    Fax:  [phone]                                       After Visit Summary   6/9/2017    Daniele Koroma    MRN: [id number]           After Visit Summary Signature Page     I have received my discharge instructions, and my questions have been answered. I have discussed any challenges I see with this plan with the nurse or doctor.    ..........................................................................................................................................  Patient/Patient Representative Signature      ..........................................................................................................................................  Patient Representative Print Name and Relationship to Patient    ..................................................               ................................................  Date                                            Time    ..........................................................................................................................................  Reviewed by Signature/Title    ...................................................              ..............................................  Date                                                            Time

## 2017-06-09 NOTE — BRIEF OP NOTE
[facility] Brief Operative Note    Pre-operative diagnosis: Rectal cancer   Post-operative diagnosis Same   Procedure: Procedure(s):  PORT PLACEMENT WITH FLAT PLATE XRAY  - Wound Class: I-Clean   Left subclavian port.     Surgeon(s): Surgeon(s) and Role:     * Fritz Pierce MD - Primary  WILLY [place]   Estimated blood loss: 2 mL    Specimens: None   Findings: N/A

## 2017-06-09 NOTE — ANESTHESIA POSTPROCEDURE EVALUATION
Patient: Daniele Koroma    Procedure(s):  PORT PLACEMENT WITH FLAT PLATE XRAY  - Wound Class: I-Clean    Diagnosis:rectal cancer  Diagnosis Additional Information: No value filed.    Anesthesia Type:  MAC    Note:  Anesthesia Post Evaluation    Patient location during evaluation: PACU  Patient participation: Able to fully participate in evaluation  Level of consciousness: awake  Pain management: adequate  Airway patency: patent  Cardiovascular status: acceptable  Respiratory status: acceptable  Hydration status: acceptable  PONV: none     Anesthetic complications: None          Last vitals:  Vitals:    06/09/17 1003 06/09/17 1132 06/09/17 1200   BP: 127/83 106/77    Resp: 16 16 16   Temp: 36.3  C (97.4  F) 36.1  C (96.9  F)    SpO2: 97% 97%          Electronically Signed By: Nathaniel Joe MD  June 9, 2017  12:37 PM

## 2017-06-09 NOTE — ANESTHESIA PREPROCEDURE EVALUATION
Anesthesia Evaluation     . Pt has had prior anesthetic. 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,     Cardiovascular:         METS/Exercise Tolerance:     Hematologic:         Musculoskeletal:         GI/Hepatic:     (+) GERD Asymptomatic on medication,       Renal/Genitourinary:         Endo:         Psychiatric:         Infectious Disease:         Malignancy:   (+) Malignancy History of GI and Lung  GI CA  Active status post Surgery and Chemo,         Other:                     Physical Exam  Normal systems: cardiovascular and pulmonary    Airway   Mallampati: I  TM distance: >3 FB  Neck ROM: full    Dental     Cardiovascular       Pulmonary                     Anesthesia Plan      History & Physical Review  History and physical reviewed and following examination; no interval change.    ASA Status:  2 .    NPO Status:  > 6 hours    Plan for MAC   PONV prophylaxis:  Ondansetron (or other 5HT-3) and Dexamethasone or Solumedrol       Postoperative Care  Postoperative pain management:  Oral pain medications.      Consents  Anesthetic plan, risks, benefits and alternatives discussed with:  Patient..                          .  DPreop diagnosis: rectal cancer  Procedure(s):  INSERT PORT VASCULAR ACCESS  No Known Allergies    No current facility-administered medications on file prior to encounter.   Current Outpatient Prescriptions on File Prior to Encounter:  IBUPROFEN PO Take 400 mg by mouth every 6 hours as needed for moderate pain   order for DME Equipment being ordered: Other: ostomy supplies 1 or 2 piece barrier and pouch, deodorizing drops and spray; ostomy rings, ostomy pasteTreatment Diagnosis: Rectal cancer     Hemoglobin   Date Value Ref Range Status   05/26/2017 14.8 13.3 - 17.7 g/dL Final     INR   Date Value Ref Range Status   05/26/2017 1.00 0.86 - 1.14 Final

## 2017-06-11 NOTE — OP NOTE
DATE OF PROCEDURE:  06/09/2017        1st ASSISTANT:  Shana Saucedo PA-C      PREOPERATIVE DIAGNOSIS:  Rectal cancer.      POSTOPERATIVE DIAGNOSIS:  Rectal cancer.       PROCEDURE:  Placement of ClearVUE PowerPort implantable port, left subclavian vein.      ANESTHESIA:  Local with lidocaine 1% without epinephrine and sedation.      INDICATIONS:  Daniele Koroma is a 57-year-old gentleman who was undergoing chemotherapy.  PowerPort is indicated for venous access.      DESCRIPTION OF PROCEDURE:  The patient was brought to the OR and placed in supine position.  The patient was placed into Trendelenburg.  IV sedation was given.  Neck and upper chest were prepared and draped in the usual fashion using ChloraPrep.  Local anesthesia was done with lidocaine 1% without epinephrine.  The left subclavian vein was punctured easily on the first attempt.  There was excellent blood return.  A guidewire was advanced without any resistance.  The needle was removed.  A transverse incision was made using the prior incision from a port placed in the past.  The subcutaneous pocket was made inferior to the incision.  Hemostasis was excellent.  An introducer with a peel-away sheath was introduced over the guidewire into the vein without any resistance.  The guidewire and introducer were removed.  The catheter was advanced through the peel-away sheath without any resistance.  The tip of the catheter was placed at 20 cm to the skin level.  There was excellent blood return.  The catheter was cut and connected to the port.  The port was placed in the subcutaneous pocket.  The incision was closed in the usual fashion.  The port was accessed with a non-coring needle.  There was excellent blood return and the port was finally flushed with 10 mL of solution of heparin flush.  Chest x-ray shows the catheter was in excellent position and PowerPort ready to be used.        ESTIMATED BLOOD LOSS:  Minimal.        SPONGE COUNTS:  Correct.         GEO  DAWIT PERDOMO MD             D: 06/10/2017 08:29   T: 06/10/2017 21:55   MT: [id number]      Name:     YAZAN PINEDA   MRN:      [id number]        Account:        [id number]   :      1960           Procedure Date: 2017      Document: [id number]

## 2017-06-13 ENCOUNTER — HOME INFUSION (PRE-WILLOW HOME INFUSION) (OUTPATIENT)
Dept: PHARMACY | Facility: CLINIC | Age: 57
End: 2017-06-13

## 2017-07-20 NOTE — PROGRESS NOTES
This is a recent snapshot of the patient's [facility] medical record.  For current drug dose and complete information and questions, call [phone] or In Basket pool, [facility] home infusion ([id number])  CSN Number:  [id number]

## 2017-07-30 ENCOUNTER — TRANSFERRED RECORDS (OUTPATIENT)
Dept: HEALTH INFORMATION MANAGEMENT | Facility: CLINIC | Age: 57
End: 2017-07-30

## 2017-08-01 ENCOUNTER — TELEPHONE (OUTPATIENT)
Dept: FAMILY MEDICINE | Facility: CLINIC | Age: 57
End: 2017-08-01

## 2017-08-01 ENCOUNTER — HOSPITAL ENCOUNTER (EMERGENCY)
Facility: CLINIC | Age: 57
Discharge: HOME | End: 2017-08-01
Attending: EMERGENCY MEDICINE | Admitting: EMERGENCY MEDICINE
Payer: COMMERCIAL

## 2017-08-01 ENCOUNTER — OFFICE VISIT (OUTPATIENT)
Dept: OPHTHALMOLOGY | Facility: CLINIC | Age: 57
End: 2017-08-01
Attending: OPHTHALMOLOGY
Payer: COMMERCIAL

## 2017-08-01 ENCOUNTER — TRANSFERRED RECORDS (OUTPATIENT)
Dept: HEALTH INFORMATION MANAGEMENT | Facility: CLINIC | Age: 57
End: 2017-08-01

## 2017-08-01 VITALS
WEIGHT: 155 LBS | DIASTOLIC BLOOD PRESSURE: 78 MMHG | OXYGEN SATURATION: 97 % | TEMPERATURE: 97.8 F | BODY MASS INDEX: 22.19 KG/M2 | SYSTOLIC BLOOD PRESSURE: 126 MMHG | RESPIRATION RATE: 18 BRPM | HEIGHT: 70 IN | HEART RATE: 66 BPM

## 2017-08-01 DIAGNOSIS — H33.012: ICD-10-CM

## 2017-08-01 DIAGNOSIS — H33.311 RETINAL HORSESHOE TEAR WITHOUT DETACHMENT, RIGHT: Primary | ICD-10-CM

## 2017-08-01 DIAGNOSIS — H33.313 RETINAL TEAR OF BOTH EYES: ICD-10-CM

## 2017-08-01 PROCEDURE — 67145 PROPH RTA DTCHMNT PC: CPT | Mod: ZF | Performed by: OPHTHALMOLOGY

## 2017-08-01 PROCEDURE — 99207 ZZC APP CREDIT; MD BILLING SHARED VISIT: CPT | Mod: Z6 | Performed by: EMERGENCY MEDICINE

## 2017-08-01 PROCEDURE — 99283 EMERGENCY DEPT VISIT LOW MDM: CPT | Performed by: EMERGENCY MEDICINE

## 2017-08-01 ASSESSMENT — CONF VISUAL FIELD
OD_NORMAL: 1
OS_NORMAL: 1

## 2017-08-01 ASSESSMENT — CUP TO DISC RATIO
OS_RATIO: 0.3
OD_RATIO: 0.3

## 2017-08-01 ASSESSMENT — SLIT LAMP EXAM - LIDS: COMMENTS: 2+ ECCHYMOSIS

## 2017-08-01 ASSESSMENT — VISUAL ACUITY
METHOD: SNELLEN - LINEAR
OS_SC: 20/60
OD_SC: 20/400

## 2017-08-01 ASSESSMENT — EXTERNAL EXAM - RIGHT EYE: OD_EXAM: 2+ ECCHYMOSIS

## 2017-08-01 ASSESSMENT — TONOMETRY
OD_IOP_MMHG: 19
OS_IOP_MMHG: 15
IOP_METHOD: TONOPEN

## 2017-08-01 ASSESSMENT — EXTERNAL EXAM - LEFT EYE: OS_EXAM: 2+ ECCHYMOSIS

## 2017-08-01 NOTE — ED AVS SNAPSHOT
[facility], [place], Emergency Department    [address] [place] [address]    Phone:  [phone]                                       Daniele Koroma   MRN: [id number]    Department:  [facility], Emergency Department   Date of Visit:  8/1/2017           After Visit Summary Signature Page     I have received my discharge instructions, and my questions have been answered. I have discussed any challenges I see with this plan with the nurse or doctor.    ..........................................................................................................................................  Patient/Patient Representative Signature      ..........................................................................................................................................  Patient Representative Print Name and Relationship to Patient    ..................................................               ................................................  Date                                            Time    ..........................................................................................................................................  Reviewed by Signature/Title    ...................................................              ..............................................  Date                                                            Time

## 2017-08-01 NOTE — TELEPHONE ENCOUNTER
Jordyn from [facility] in [place] called.   Pt fell of a roof, going home today, multiple trauma. Needs a few things.   Trauma services requesting see othro, neurosurgery, and plastics referral numbers. Given per referral book.    Pt would need a Hemogram the day of his appointment on Friday.   Jordyn is faxing pt's chart attn to Dr. Jennifer Rodriguez to [facility].

## 2017-08-01 NOTE — MR AVS SNAPSHOT
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      Your next 10 appointments already scheduled     Aug 03, 2017  8:45 AM CDT   NEW RETINA with Viviana Hancock MD   Eye Clinic ([facility])    [facility]  [address]  [address] [address]  [facility] [id number] [phone]            Aug 04, 2017 11:00 AM CDT   SHORT with Jennifer Rodriguez MD   [facility]    [address] [place] [address] [phone]              Who to contact     Please call your clinic at [phone] to:    Ask questions about your health    Make or cancel appointments    Discuss your medicines    Learn about your test results    Speak to your doctor   If you have compliments or concerns about an experience at your clinic, or if you wish to file a complaint, please contact [facility] Patient Relations at [phone] or email us at [email or web address]         Additional Information About Your Visit        MyChart Information     eriQoo is an electronic gateway that provides easy, online access to your medical records. With eriQoo, you can request a clinic appointment, read your test results, renew a prescription or communicate with your care team.     To sign up for eriQoo visit the website at [email or web address]   You will be asked to enter the access code listed below, as  well as some personal information. Please follow the directions to create your username and password.     Your access code is: [email or web address]  Expires: 2017  9:12 AM     Your access code will  in 90 days. If you need help or a new code, please contact your [facility] or call [phone] for assistance.        Care EveryWhere ID     This is your Care EveryWhere ID. This could be used by other organizations to access your [facility] medical records  [id number]         Blood Pressure from Last 3 Encounters:   17 126/78   17 114/83   17 115/74    Weight from Last 3 Encounters:   17 70.3 kg (155 lb)   17 71.7 kg (158 lb)   17 70.3 kg (155 lb)              We Performed the Following     Retinopexy Laser Prophylaxis Break (s) OD (right eye)     Retinopexy Laser Prophylaxis Break (s) OS (left eye)        Primary Care Provider Office Phone # Fax #    Jennifer Doris Rodriguez [phone]       [facility] [address] [address]  [facility] [address]        Equal Access to Services     CATALINA HELM AH: Hadii omega ku hadasho Soomaali, waaxda luqadaha, qaybta kaalmada adeegyada, eileen garcia. So [facility] [phone].    ATENCIÓN: Si habla español, tiene a quiles disposición servicios gratuitos de asistencia lingüística. Llame al [phone].    We comply with applicable federal civil rights laws and [place] laws. We do not discriminate on the basis of race, color, national origin, age, disability sex, sexual orientation or gender identity.            Thank you!     Thank you for choosing EYE CLINIC  for your care. Our goal is always to provide you with excellent care. Hearing back from our patients is one way we can continue to improve our services. Please take a few minutes to complete the written survey that you may receive in the mail after your visit with us. Thank you!             Your Updated  Medication List - Protect others around you: Learn how to safely use, store and throw away your medicines at www.disposemymeds.org.          This list is accurate as of: 8/1/17  9:59 PM.  Always use your most recent med list.                   Brand Name Dispense Instructions for use Diagnosis    IBUPROFEN PO      Take 400 mg by mouth every 6 hours as needed for moderate pain        order for DME     1 Device    Equipment being ordered: Other: ostomy supplies 1 or 2 piece barrier and pouch, deodorizing drops and spray; ostomy rings, ostomy paste Treatment Diagnosis: Rectal cancer    Rectal cancer metastasised to intrapelvic lymph node, Colostomy in place (H)

## 2017-08-01 NOTE — ED AVS SNAPSHOT
[facility], Emergency Department    [address] [facility] [address]    Phone:  [phone]                                       Daniele Koroma   MRN: [id number]    Department:  [facility], Emergency Department   Date of Visit:  8/1/2017           Patient Information     Date Of Birth          1960        Your diagnoses for this visit were:     Retinal tear of both eyes        You were seen by Tommy Shaikh MD.        Discharge Instructions       Follow-up in ophthalmology clinic on Friday.  They will call you to set up the appointment.  Eye Clinic (phone: [phone])     Return to the emergency Department for any problems.      Future Appointments        Provider Department Dept Phone Center    8/3/2017 8:45 AM Viviana Hancock MD [facility] Clinic [phone] [facility]    8/4/2017 11:00 AM Jennifer Rodriguez MD [facility] [phone] [facility]      24 Hour Appointment Hotline       To make an appointment at any [facility], call [phone] ([phone]). If you don't have a family doctor or clinic, we will help you find one. [facility] are conveniently located to serve the needs of you and your family.             Review of your medicines      Our records show that you are taking the medicines listed below. If these are incorrect, please call your family doctor or clinic.        Dose / Directions Last dose taken    IBUPROFEN PO   Dose:  400 mg        Take 400 mg by mouth every 6 hours as needed for moderate pain   Refills:  0        order for DME   Quantity:  1 Device        Equipment being ordered: Other: ostomy supplies 1 or 2 piece barrier and pouch, deodorizing drops and spray; ostomy rings, ostomy paste Treatment Diagnosis: Rectal cancer   Refills:  3                Orders Needing Specimen Collection     None      Pending Results     No orders found from 7/30/2017 to 8/2/2017.            Pending Culture Results     No orders found  "from 2017 to 2017.            Pending Results Instructions     If you had any lab results that were not finalized at the time of your Discharge, you can call the ED Lab Result RN at [phone]. You will be contacted by this team for any positive Lab results or changes in treatment. The nurses are available 7 days a week from 10A to 6:30P.  You can leave a message 24 hours per day and they will return your call.        Thank you for choosing [facility]       Thank you for choosing [facility] for your care. Our goal is always to provide you with excellent care. Hearing back from our patients is one way we can continue to improve our services. Please take a few minutes to complete the written survey that you may receive in the mail after you visit with us. Thank you!        [employer] Information     [employer] lets you send messages to your doctor, view your test results, renew your prescriptions, schedule appointments and more. To sign up, go to [email or web address] . Click on \"Log in\" on the left side of the screen, which will take you to the Welcome page. Then click on \"Sign up Now\" on the right side of the page.     You will be asked to enter the access code listed below, as well as some personal information. Please follow the directions to create your username and password.     Your access code is: [email or web address]  Expires: 2017  9:12 AM     Your access code will  in 90 days. If you need help or a new code, please call your [facility] clinic or [phone].        Care EveryWhere ID     This is your Care EveryWhere ID. This could be used by other organizations to access your [facility] medical records  [id number]        Equal Access to Services     CATALINA HELM : Hadii omega Cotton, dottie wood, eileen cosme. So [facility] [phone].    ATENCIÓN: Si habla español, tiene a quiles disposición servicios gratuitos de asistencia lingüística. " Amrit diana [phone].    We comply with applicable federal civil rights laws and [place] laws. We do not discriminate on the basis of race, color, national origin, age, disability sex, sexual orientation or gender identity.            After Visit Summary       This is your record. Keep this with you and show to your community pharmacist(s) and doctor(s) at your next visit.

## 2017-08-02 ENCOUNTER — TELEPHONE (OUTPATIENT)
Dept: OPHTHALMOLOGY | Facility: CLINIC | Age: 57
End: 2017-08-02

## 2017-08-02 NOTE — TELEPHONE ENCOUNTER
----- Message from Clint Armijo MD sent at 8/1/2017 11:00 PM CDT -----  Regarding: Follow Up   Please schedule patient with Dr. Powell on 8/4. He is s/p retinopexy x3 on 8/1. Please call patient with time. Thank you!

## 2017-08-02 NOTE — H&P
OPHTHALMOLOGY CONSULT NOTE  08/01/17    Patient: Daniele Koroma  Consulted by: ED    Reason for Consult: orbital trauma     HISTORY OF PRESENTING ILLNESS:     Daniele Koroma is a 57 year old male with a history of rectal cancer stage, s/p chemo 2 weeks ago, who was transferred to the [facility] for further management a retinal tear, left eye. Two days ago the patient fell off of a roof and sustained a right orbital fracture and right arm fracture. He was found to have a retinal tear that was not able to be repaired at previous institution. He has continued pain of both orbits. He denies any previous eye history, flashes of light, or new floaters.     Review of systems were otherwise negative except for that which has been stated above.      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)      Colostomy in place (H)      GERD (gastroesophageal reflux disease)      Hyperlipidemia      Malignant neoplasm of lower lobe of right lung (H)      Nodule of right lung      Noninfectious ileitis      Noninfectious ileitis      Rectal bleeding since 2009     Rectal cancer (H)        Past Surgical History:  Past Surgical History:   Procedure Laterality Date     COLONOSCOPY N/A 6/3/2015    Procedure: COMBINED COLONOSCOPY, SINGLE OR MULTIPLE BIOPSY/POLYPECTOMY BY BIOPSY;  Surgeon: Sparkle Weber MD;  Location:  GI     COMBINED CYSTOSCOPY, INSERT CATHETER URETER Bilateral 10/14/2015    Procedure: COMBINED CYSTOSCOPY, INSERT CATHETER URETER;  Surgeon: Nathaniel Chang MD;  Location:  OR     DAVINCI COLECTOMY N/A 10/14/2015    Procedure: DAVINCI XI COLECTOMY;  Surgeon: Shana Alvarado MD;  Location: [facility] OR     DAVINCI LOBECTOMY LUNG Right 7/22/2016    Procedure: DAVINCI XI LOBECTOMY LUNG;  Surgeon: Stan  Fritz Vega MD;  Location: [facility] OR     INCISION AND DRAINAGE RECTUM, COMBINED N/A 11/4/2015    Procedure: COMBINED INCISION AND DRAINAGE RECTUM;  Surgeon: Bradley Regalado MD;  Location:  OR     INSERT PORT VASCULAR ACCESS N/A 11/6/2015    Procedure: INSERT PORT VASCULAR ACCESS;  Surgeon: Fritz Pierce MD;  Location:  OR     INSERT PORT VASCULAR ACCESS N/A 9/12/2016    Procedure: INSERT PORT VASCULAR ACCESS;  Surgeon: Fritz Pierce MD;  Location:  SD     INSERT PORT VASCULAR ACCESS N/A 6/9/2017    Procedure: INSERT PORT VASCULAR ACCESS;  PORT PLACEMENT WITH FLAT PLATE XRAY ;  Surgeon: Fritz Pierce MD;  Location:  SD     port removed      x2           EXAMINATION:      Right Eye Left Eye Notes   Visual Acuity 20/400 20/60 SC   Pupils PERRL w/A no APD PERRL w/A no APD    IOP 19 mmHg 15 mmHg tonopen (<5% error)   Motility Full Full    CVF Full Full      External/Slit Lamp Exam   Right Eye Left Eye Notes   Lids/Lashes Ecchymosis 360, mild edema  Ecchymosis 360, mild edema     Conjunctiva/Sclera Sub conj hemorrhage  Sub conj hemorrhage     Cornea Clear Clear    Anterior Chamber Deep and Quiet Deep and Quiet    Iris Round and Reactive Round and Reactive    Lens Tr NS Tr NS       Dilated Fundus Exam      Right Eye Left Eye Notes   Anterior Vitreous Clear, syneresis   Clear, syneresis     Media Clear Clear     Optic Nerve Normal contours and Size Normal contours and Size    Cup to Disc Ratio 0.3 0.3     Macula Flat, No pigment abnormality  Flat, No pigment abnormality     Vessels Normal caliber and distribution Normal caliber and distribution    Retinal Periphery , HST ST anterior, no fluid, HST IN anterior no fluid   HST superior with 3-4 DD SRF posterior, fluid anterior to equator       Time: Dilated at 7:00 pm with Phenylephrine 2.5% and Tropicamide 1% (dilation lasts about 4-6 hours)   Labs/Studies/Imaging Performed  None      ASSESSMENT & PLAN:     Daniele Koroma is  a 57 year old male who presents with facial trauma with new retinal tears of both eyes.      # [place] Retinal Tear, right x2, left x1  # Localized Retinal Detachment, left  - no fluid OD  - Discussed RBA of retinopexy with patient  - Informed consent obtained   - Laser retinopexy performed around each tear   - Patient to follow up in clinic on 8/4/17     # Vitreous syneresis vs PVD, bilateral  - Patient was counseled regarding signs and symptoms of retinal detachment     It is our pleasure to participate in this patient's care. Please contact us with any further questions or concerns.    Seen and Discussed with Dr. Orlando Wei MD  Ophthalmology Resident, PGY-2  [facility]

## 2017-08-02 NOTE — PROGRESS NOTES
CC -   HST     INTERVAL HISTORY - Initial visit    HPI -   Daniele Koroma is a  57 year old year-old patient presenting for HST OS.  Fell off roof 2 days prior (7/30/17) and had orbital Fx OD.  Evaluation found retinal tear OS.  H/o stage 4 rectal CA, last ChemoRx 2 weeks ago      PAST OCULAR SURGERY  None    RETINAL IMAGING:  None       ASSESSMENT & PLAN    1.  HST OD x 2 and OS x 1 with localized RD OS   - no fluid OD   - IN tear OD may be operculated, hard to depress to ascertain   - RD OS is anterior to equator     - advise pexy OU today   - r/b/a d/w patient, need for further Tx, progression of RD, vision loss    2.  vitreous syneresis vs PVD OU   - advised S/Sx RD      3. Tr NS OU    4.  Orbital Fracture OD   - will need referral in future      5.  Rectal CA   - no ocular involvement      return to clinic: 3 days, DFE OU    ATTESTATION     Attending Attestation:     Complete documentation of historical and exam elements from today's encounter can be found in the full encounter summary report (not reduplicated in this progress note).  I personally obtained the chief complaint(s) and history of present illness.  I confirmed and edited as necessary the review of systems, past medical/surgical history, family history, social history, and examination findings as documented by others; and I examined the patient myself.  I personally reviewed the relevant tests, images, and reports as documented above.  I formulated and edited as necessary the assessment and plan and discussed the findings and management plan with the patient and family    Ava Perry MD, PhD  , Vitreoretinal Surgery  Department of Ophthalmology  [facility]

## 2017-08-02 NOTE — ED NOTES
Bed: IN01  Expected date:   Expected time:   Means of arrival:   Comments:  Daniele Koroma [birth year] here for retinal tear Ophthalmology aware from [facility] 6 plus pm

## 2017-08-02 NOTE — TELEPHONE ENCOUNTER
I scheduled this Friday for 3 day f/u with dr. nava at 7:30AM for s/p retinopexy    3 attempts to review date/time with pt and no answer and unable to leave voicemessage  Will attempt tomorrow  Karsten Humphreys RN 3:26 PM 08/02/17

## 2017-08-02 NOTE — DISCHARGE INSTRUCTIONS
Follow-up in ophthalmology clinic on Friday.  They will call you to set up the appointment.  Eye Clinic (phone: [phone])     Return to the emergency Department for any problems.

## 2017-08-02 NOTE — ED NOTES
Transfer from [facility] for opthamology consult of his left eye for possible retinal tear. Denies vision changes.   Patient fell of the roof at his cabin this past Sunday morning.  Denies LOC, reports dizziness initially but has now resolved.  Hx rectal cancer, currently on chemo- last dose 2 weeks ago.

## 2017-08-03 ASSESSMENT — ENCOUNTER SYMPTOMS
FEVER: 0
WOUND: 1

## 2017-08-03 NOTE — ED PROVIDER NOTES
History     Chief Complaint   Patient presents with     Eye Injury     HPI  Daniele Koroma is a 57-year-old male who presented to the Emergency Department to see Ophthalmology for possible left eye retinal tear.  This patient had been an inpatient in [place] after falling off the roof his cabin.  He had suffered a right radial fracture as well as multiple facial fractures and pneumocephaly.  He has follow-up appointments scheduled with other providers for these injuries but was sent to the Emergency Department here to be evaluated by Ophthalmology as a probable left retinal tear was noted and they did not have the laser equipment to provide treatment for this injury in [place].  Patient has no other complaints at this time.        This part of the document was transcribed by Sully Gaxiola.   I have reviewed the Medications, Allergies, Past Medical and Surgical History, and Social History in the [facility] system.  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)      Colostomy in place (H)      GERD (gastroesophageal reflux disease)      Hyperlipidemia      Malignant neoplasm of lower lobe of right lung (H)      Nodule of right lung      Noninfectious ileitis      Noninfectious ileitis      Rectal bleeding since 2009     Rectal cancer (H)        Past Surgical History:   Procedure Laterality Date     COLONOSCOPY N/A 6/3/2015    Procedure: COMBINED COLONOSCOPY, SINGLE OR MULTIPLE BIOPSY/POLYPECTOMY BY BIOPSY;  Surgeon: Sparkle Weber MD;  Location:  GI     COMBINED CYSTOSCOPY, INSERT CATHETER URETER Bilateral 10/14/2015    Procedure: COMBINED CYSTOSCOPY, INSERT CATHETER URETER;  Surgeon: Nathaniel Chang MD;  Location:  OR     DAVINCI COLECTOMY N/A 10/14/2015    Procedure: DAVINCI XI COLECTOMY;  Surgeon: Shana Alvarado MD;  Location: [facility] OR     DAVINCI LOBECTOMY LUNG Right 7/22/2016    Procedure: DAVINCI XI LOBECTOMY LUNG;   Surgeon: Fritz Pierce MD;  Location: [facility] OR     INCISION AND DRAINAGE RECTUM, COMBINED N/A 11/4/2015    Procedure: COMBINED INCISION AND DRAINAGE RECTUM;  Surgeon: Bradley Regalado MD;  Location: [facility] OR     INSERT PORT VASCULAR ACCESS N/A 11/6/2015    Procedure: INSERT PORT VASCULAR ACCESS;  Surgeon: Fritz Pierce MD;  Location: [facility] OR     INSERT PORT VASCULAR ACCESS N/A 9/12/2016    Procedure: INSERT PORT VASCULAR ACCESS;  Surgeon: Fritz Pierce MD;  Location:  [facility]     INSERT PORT VASCULAR ACCESS N/A 6/9/2017    Procedure: INSERT PORT VASCULAR ACCESS;  PORT PLACEMENT WITH FLAT PLATE XRAY ;  Surgeon: Fritz Pierce MD;  Location:  [facility]     port removed      x2       Family History   Problem Relation Age of Onset     Unknown/Adopted Mother      Unknown/Adopted Father      Lung Cancer Brother      Lung Cancer Brother      DIABETES No family hx of      Coronary Artery Disease No family hx of      Hypertension No family hx of      Hyperlipidemia No family hx of      CEREBROVASCULAR DISEASE No family hx of      Breast Cancer No family hx of      Colon Cancer No family hx of      Prostate Cancer No family hx of      Other Cancer No family hx of      Depression No family hx of      Anxiety Disorder No family hx of      MENTAL ILLNESS No family hx of      Substance Abuse No family hx of      Anesthesia Reaction No family hx of      Asthma No family hx of      OSTEOPOROSIS No family hx of      Genetic Disorder No family hx of      Thyroid Disease No family hx of      Obesity No family hx of        Social History   Substance Use Topics     Smoking status: Never Smoker     Smokeless tobacco: Not on file     Alcohol use 0.0 oz/week     0 Standard drinks or equivalent per week       No current facility-administered medications for this encounter.      Current Outpatient Prescriptions   Medication     IBUPROFEN PO     order for DME      No Known Allergies    Review of Systems  "  Constitutional: Negative for fever.   Eyes: Positive for visual disturbance.   Skin: Positive for wound.   All other systems reviewed and are negative.      Physical Exam   BP: 127/79  Pulse: 66  Heart Rate: 50  Temp: 97.8  F (36.6  C)  Resp: 18  Height: 177.8 cm (5' 10\")  Weight: 70.3 kg (155 lb)  SpO2: 94 %  Physical Exam   Constitutional: He is oriented to person, place, and time. He appears well-developed and well-nourished.  Non-toxic appearance. He does not appear ill. No distress.   HENT:   Head: Normocephalic. Head is with raccoon's eyes.   Eyes: EOM are normal. Pupils are equal, round, and reactive to light. No scleral icterus.   Neck: Normal range of motion. Neck supple.   Cardiovascular: Normal rate and regular rhythm.    Pulmonary/Chest: Effort normal and breath sounds normal. No respiratory distress.   Neurological: He is alert and oriented to person, place, and time. He has normal strength. No sensory deficit.   Skin: Skin is warm and dry. No rash noted. No pallor.   Psychiatric: He has a normal mood and affect. His behavior is normal.   Nursing note and vitals reviewed.      ED Course     ED Course     Procedures               Assessments & Plan (with Medical Decision Making)   This patient presented to be seen by Ophthalmology.  He otherwise was stable and without other complaints.  He has adequate follow-up for his other injuries and was seen in the Emergency Department by Ophthalmology and taken up to their clinic where they were able to evaluate and diagnose several retinal tears in both eyes and treat these with laser therapy.  He will follow up with Ophthalmology and was discharged in good condition after discussion with the Ophthalmology resident.    I have reviewed the nursing notes.    I have reviewed the findings, diagnosis, plan and need for follow up with the patient.  This part of the document was transcribed by Sully Gaxiola Scribangelo.   Discharge Medication List as of 8/1/2017 "  9:25 PM          Final diagnoses:   Retinal tear of both eyes       8/1/2017   [facility], [place], EMERGENCY DEPARTMENT     Tommy Shaikh MD  08/03/17 1035

## 2017-08-03 NOTE — TELEPHONE ENCOUNTER
Spoke to emergency contact  Tomorrow's appt information provided and states able to make appt  Karsten Humphreys RN 3:04 PM 08/03/17

## 2017-08-04 ENCOUNTER — OFFICE VISIT (OUTPATIENT)
Dept: OPHTHALMOLOGY | Facility: CLINIC | Age: 57
End: 2017-08-04
Attending: OPHTHALMOLOGY
Payer: COMMERCIAL

## 2017-08-04 DIAGNOSIS — H33.012: ICD-10-CM

## 2017-08-04 DIAGNOSIS — H33.311 RETINAL HORSESHOE TEAR WITHOUT DETACHMENT, RIGHT: Primary | ICD-10-CM

## 2017-08-04 PROCEDURE — 67145 PROPH RTA DTCHMNT PC: CPT | Mod: 50 | Performed by: OPHTHALMOLOGY

## 2017-08-04 PROCEDURE — 99213 OFFICE O/P EST LOW 20 MIN: CPT | Mod: ZF

## 2017-08-04 RX ORDER — OXYCODONE HYDROCHLORIDE 5 MG/1
5-10 TABLET ORAL
Status: ON HOLD | COMMUNITY
Start: 2017-08-01 | End: 2017-08-22

## 2017-08-04 ASSESSMENT — EXTERNAL EXAM - LEFT EYE: OS_EXAM: 2+ ECCHYMOSIS

## 2017-08-04 ASSESSMENT — EXTERNAL EXAM - RIGHT EYE: OD_EXAM: 2+ ECCHYMOSIS

## 2017-08-04 ASSESSMENT — CUP TO DISC RATIO
OD_RATIO: 0.3
OS_RATIO: 0.3

## 2017-08-04 ASSESSMENT — SLIT LAMP EXAM - LIDS: COMMENTS: 2+ ECCHYMOSIS

## 2017-08-04 ASSESSMENT — CONF VISUAL FIELD
METHOD: COUNTING FINGERS
OD_NORMAL: 1
OS_NORMAL: 1

## 2017-08-04 ASSESSMENT — VISUAL ACUITY
CORRECTION_TYPE: CONTACTS
OS_CC: 20/20
OD_SC: 20/200
METHOD: SNELLEN - LINEAR
OD_PH_SC: 20/100

## 2017-08-04 ASSESSMENT — TONOMETRY
IOP_METHOD: TONOPEN
OS_IOP_MMHG: 14
OD_IOP_MMHG: 13

## 2017-08-04 NOTE — PROGRESS NOTES
CC -   HST     INTERVAL HISTORY -f/u from retinopexy OU 3d ago, c/o few floaters post pexy, now improved; denies flashers    HPI -   Daniele Koroma is a  57 year old year-old patient presenting with HST OS x 1 &  HST OD x2 s/p laser retinopexy 8/1/17.  Fell off roof (7/30/17) and had orbital Fx OD.  Evaluation found retinal tear OS.  H/o stage 4 rectal CA, last ChemoRx mid July 2017.      PAST OCULAR SURGERY  HST OD x2 s/p laser pexy 8/1/17  HST OS s/p laser pexy 8/1/17    RETINAL IMAGING:  None       ASSESSMENT & PLAN    1.  HST OD x 2 and OS x 1 with localized RD OS s/p    - s/p pexy OU 8/1/17   - HST ST OD has SRF under prior laser   - laser line OS is thin      - augment laser pexy OU today   - RTC next week    2.  vitreous syneresis vs PVD OU   - advised S/Sx RD      3. Tr NS OU   -not wearing SCL OD, uncorrected VA 20/100    4.  Orbital Fracture OD   - has plastic surgery referral setup at outside facility      5.  Rectal CA   - no ocular involvement      return to clinic: 1 week    Ean Arreola MD  PGY3, Dept of Ophthalmology  Pager [phone]        ATTESTATION     Attending Attestation:     Complete documentation of historical and exam elements from today's encounter can be found in the full encounter summary report (not reduplicated in this progress note).  I personally obtained the chief complaint(s) and history of present illness.  I confirmed and edited as necessary the review of systems, past medical/surgical history, family history, social history, and examination findings as documented by others; and I examined the patient myself.  I personally reviewed the relevant tests, images, and reports as documented above.  I formulated and edited as necessary the assessment and plan and discussed the findings and management plan with the patient and family    Ava Perry MD, PhD  , Vitreoretinal Surgery  Department of Ophthalmology  [facility]

## 2017-08-04 NOTE — NURSING NOTE
Chief Complaints and History of Present Illnesses   Patient presents with     Follow Up For     3 day follow up s/p Retinopexy both eyes.     HPI    Affected eye(s):  Both   Symptoms:     No flashes   No redness   No Dryness         Do you have eye pain now?:  No      Comments:  Pt uncertain if vision has changed since last visit. More floaters seen in RE, no changes in floaters LE. Achy feeling in RE today.    Margarito CERDA August 4, 2017 7:35 AM

## 2017-08-04 NOTE — MR AVS SNAPSHOT
After Visit Summary   8/4/2017    Daniele Koroma    MRN: [id number]           Patient Information     Date Of Birth          1960        Visit Information        Provider Department      8/4/2017 7:30 AM Ava Perry MD [facility]        Today's Diagnoses     Retinal horseshoe tear without detachment, right    -  1    Retinal detachment with single retinal tear, left           Follow-ups after your visit        Follow-up notes from your care team     Return in about 5 days (around 8/9/2017) for HST OU s/p pexy.      Your next 10 appointments already scheduled     Aug 04, 2017 11:00 AM CDT   [facility] with [facility] MD   [facility]    [address] [place] [id number]   [phone]            Aug 08, 2017  3:00 PM CDT   RETURN RETINA with Ava Perry MD   [facility] ([facility])    [place] [address]  [address] [address]  [facility] [address] [phone]              Who to contact     Please call your clinic at [phone] to:    Ask questions about your health    Make or cancel appointments    Discuss your medicines    Learn about your test results    Speak to your doctor   If you have compliments or concerns about an experience at your clinic, or if you wish to file a complaint, please contact [facility] Patient Relations at [phone] or email us at [email or web address]         Additional Information About Your Visit        MyChart Information     ID90T is an electronic gateway that provides easy, online access to your medical records. With ID90T, you can request a clinic appointment, read your test results, renew a prescription or communicate with your care team.     To sign up for ID90T visit the website at [email or web address]   You will be asked to enter the access  code listed below, as well as some personal information. Please follow the directions to create your username and password.     Your access code is: [email or web address]  Expires: 2017  9:12 AM     Your access code will  in 90 days. If you need help or a new code, please contact your [facility] or call [phone] for assistance.        Care EveryWhere ID     This is your Care EveryWhere ID. This could be used by other organizations to access your [place] medical records  [id number]         Blood Pressure from Last 3 Encounters:   17 126/78   17 114/83   17 115/74    Weight from Last 3 Encounters:   17 70.3 kg (155 lb)   17 71.7 kg (158 lb)   17 70.3 kg (155 lb)              Today, you had the following     No orders found for display       Primary Care Provider Office Phone # Fax #    Jennifer Doris Rodriguez [phone]       [facility] [address] [facility] [address]        Equal Access to Services     CATALINA HELM : Hadii aad ku hadasho Soomaali, waaxda luqadaha, qaybta kaalmada adeegyada, waxay jenny haysantan natalie forrest ah. So [facility] [phone].    ATENCIÓN: Si habla español, tiene a quiles disposición servicios gratuitos de asistencia lingüística. Llame al [phone].    We comply with applicable federal civil rights laws and [place] laws. We do not discriminate on the basis of race, color, national origin, age, disability sex, sexual orientation or gender identity.            Thank you!     Thank you for choosing EYE CLINIC  for your care. Our goal is always to provide you with excellent care. Hearing back from our patients is one way we can continue to improve our services. Please take a few minutes to complete the written survey that you may receive in the mail after your visit with us. Thank you!             Your Updated Medication List - Protect others around you: Learn how to safely  use, store and throw away your medicines at www.disposemymeds.org.          This list is accurate as of: 8/4/17  9:18 AM.  Always use your most recent med list.                   Brand Name Dispense Instructions for use Diagnosis    IBUPROFEN PO      Take 400 mg by mouth every 6 hours as needed for moderate pain        order for DME     1 Device    Equipment being ordered: Other: ostomy supplies 1 or 2 piece barrier and pouch, deodorizing drops and spray; ostomy rings, ostomy paste Treatment Diagnosis: Rectal cancer    Rectal cancer metastasised to intrapelvic lymph node, Colostomy in place (H)       oxyCODONE 5 MG IR tablet    ROXICODONE     Take 5-10 mg by mouth

## 2017-08-08 ENCOUNTER — OFFICE VISIT (OUTPATIENT)
Dept: OPHTHALMOLOGY | Facility: CLINIC | Age: 57
End: 2017-08-08
Attending: OPHTHALMOLOGY
Payer: COMMERCIAL

## 2017-08-08 DIAGNOSIS — H33.311 RETINAL HORSESHOE TEAR WITHOUT DETACHMENT, RIGHT: Primary | ICD-10-CM

## 2017-08-08 DIAGNOSIS — H33.012: ICD-10-CM

## 2017-08-08 PROCEDURE — 99213 OFFICE O/P EST LOW 20 MIN: CPT | Mod: ZF

## 2017-08-08 ASSESSMENT — VISUAL ACUITY
CORRECTION_TYPE: CONTACTS
METHOD: SNELLEN - LINEAR
OD_SC: 20/150
OD_SC: J1+
OD_PH_SC: 20/100
OS_CC: 20/20-2

## 2017-08-08 ASSESSMENT — CUP TO DISC RATIO
OS_RATIO: 0.3
OD_RATIO: 0.3

## 2017-08-08 ASSESSMENT — CONF VISUAL FIELD
METHOD: COUNTING FINGERS
OD_NORMAL: 1
OS_NORMAL: 1

## 2017-08-08 ASSESSMENT — SLIT LAMP EXAM - LIDS: COMMENTS: 2+ ECCHYMOSIS

## 2017-08-08 ASSESSMENT — TONOMETRY
OD_IOP_MMHG: 14
IOP_METHOD: TONOPEN
OS_IOP_MMHG: 12

## 2017-08-08 ASSESSMENT — EXTERNAL EXAM - LEFT EYE: OS_EXAM: 2+ ECCHYMOSIS

## 2017-08-08 ASSESSMENT — EXTERNAL EXAM - RIGHT EYE: OD_EXAM: 2+ ECCHYMOSIS

## 2017-08-08 NOTE — PROGRESS NOTES
CC -   HST     INTERVAL HISTORY -f/u from retinopexy OU 4d ago, c/o few floaters post pexy, now improved; denies flashers    HPI -   Daniele Koroma is a  57 year old year-old patient presenting with HST OS x 1 &  HST OD x2 s/p laser retinopexy 8/1/17.  Fell off roof (7/30/17) and had orbital Fx OD.  Evaluation found retinal tear OS.  H/o stage 4 rectal CA, last ChemoRx mid July 2017.      PAST OCULAR SURGERY  HST OD x2 s/p laser pexy 8/1/17 & 8/4/17  HST OS s/p laser pexy 8/1/17 & 8/4/17    RETINAL IMAGING:  None       ASSESSMENT & PLAN    1.  HST OD x 2 and OS x 1 with localized RD OS s/p    - s/p pexy OU 8/1/17 & 8/4/17   - fluid and tears contained by pexy   - RTC 1 week    2.  vitreous syneresis vs PVD OU   - advised S/Sx RD      3. Tr NS OU   -not wearing SCL OD, uncorrected VA 20/100    4.  Orbital Fracture OD   - has plastic surgery referral setup at outside facility      5.  Rectal CA   - no ocular involvement      return to clinic: 1 week    Ean Arreola MD  PGY3, Dept of Ophthalmology  Pager [phone]        ATTESTATION     Attending Attestation:     Complete documentation of historical and exam elements from today's encounter can be found in the full encounter summary report (not reduplicated in this progress note).  I personally obtained the chief complaint(s) and history of present illness.  I confirmed and edited as necessary the review of systems, past medical/surgical history, family history, social history, and examination findings as documented by others; and I examined the patient myself.  I personally reviewed the relevant tests, images, and reports as documented above.  I formulated and edited as necessary the assessment and plan and discussed the findings and management plan with the patient and family    Ava Perry MD, PhD  , Vitreoretinal Surgery  Department of Ophthalmology  [facility]

## 2017-08-08 NOTE — NURSING NOTE
Chief Complaints and History of Present Illnesses   Patient presents with     Horseshoe Tear Follow Up     s/p pexy BE      HPI    Affected eye(s):  Both   Symptoms:     No decreased vision   Floaters   No flashes         Do you have eye pain now?:  No      Comments:  BE seem to be stable, floaters still present. Unsure about flashes. No scotomas.     Susan TONY August 8, 2017 3:11 PM

## 2017-08-08 NOTE — MR AVS SNAPSHOT
After Visit Summary   2017    Daniele Koroma    MRN: [id number]           Patient Information     Date Of Birth          1960        Visit Information        Provider Department      2017 3:00 PM Ava Perry MD [facility]         Follow-ups after your visit        Your next 10 appointments already scheduled     Aug 15, 2017  9:45 AM CDT   RETURN RETINA with Ava Perry MD   Eye Clinic ([facility])    [address]  [address] [facility]  9 [address] Clin 9a  [facility] [address] [phone]              Who to contact     Please call your clinic at [phone] to:    Ask questions about your health    Make or cancel appointments    Discuss your medicines    Learn about your test results    Speak to your doctor   If you have compliments or concerns about an experience at your clinic, or if you wish to file a complaint, please contact [facility] Patient Relations at [phone] or email us at [email or web address]         Additional Information About Your Visit        MyChart Information     AdultSpace is an electronic gateway that provides easy, online access to your medical records. With AdultSpace, you can request a clinic appointment, read your test results, renew a prescription or communicate with your care team.     To sign up for AdultSpace visit the website at [email or web address]   You will be asked to enter the access code listed below, as well as some personal information. Please follow the directions to create your username and password.     Your access code is: [email or web address]  Expires: 2017  9:12 AM     Your access code will  in 90 days. If you need help or a new code, please contact your [facility] or call [phone] for assistance.        Care EveryWhere ID     This is your Care EveryWhere ID. This could be used by other organizations to access your [place]  medical records  [id number]         Blood Pressure from Last 3 Encounters:   08/01/17 126/78   06/09/17 114/83   05/26/17 115/74    Weight from Last 3 Encounters:   08/01/17 70.3 kg (155 lb)   06/09/17 71.7 kg (158 lb)   05/26/17 70.3 kg (155 lb)              Today, you had the following     No orders found for display       Primary Care Provider Office Phone # Fax #    Jennifer Doris Rodriguez [phone] [address] [facility] [address]        Equal Access to Services     [facility]: Hadii aad nickie hadasho Sojena, waaxda luqadaha, qaybta kaalmada adewillowyada, eileen forrest . So [facility] [phone].    ATENCIÓN: Si habla español, tiene a quiles disposición servicios gratuitos de asistencia lingüística. [facility] [phone].    We comply with applicable federal civil rights laws and [place] laws. We do not discriminate on the basis of race, color, national origin, age, disability sex, sexual orientation or gender identity.            Thank you!     Thank you for choosing [facility]  for your care. Our goal is always to provide you with excellent care. Hearing back from our patients is one way we can continue to improve our services. Please take a few minutes to complete the written survey that you may receive in the mail after your visit with us. Thank you!             Your Updated Medication List - Protect others around you: Learn how to safely use, store and throw away your medicines at www.disposemymeds.org.          This list is accurate as of: 8/8/17  4:00 PM.  Always use your most recent med list.                   Brand Name Dispense Instructions for use Diagnosis    IBUPROFEN PO      Take 400 mg by mouth every 6 hours as needed for moderate pain        order for DME     1 Device    Equipment being ordered: Other: ostomy supplies 1 or 2 piece barrier and pouch, deodorizing drops and spray; ostomy rings, ostomy paste Treatment Diagnosis: Rectal cancer     Rectal cancer metastasised to intrapelvic lymph node, Colostomy in place (H)       oxyCODONE 5 MG IR tablet    ROXICODONE     Take 5-10 mg by mouth

## 2017-08-22 ENCOUNTER — ANESTHESIA EVENT (OUTPATIENT)
Dept: SURGERY | Facility: CLINIC | Age: 57
DRG: 167 | End: 2017-08-22
Payer: COMMERCIAL

## 2017-08-22 ENCOUNTER — APPOINTMENT (OUTPATIENT)
Dept: GENERAL RADIOLOGY | Facility: CLINIC | Age: 57
DRG: 167 | End: 2017-08-22
Attending: THORACIC SURGERY (CARDIOTHORACIC VASCULAR SURGERY)
Payer: COMMERCIAL

## 2017-08-22 ENCOUNTER — HOSPITAL ENCOUNTER (INPATIENT)
Facility: CLINIC | Age: 57
LOS: 1 days | Discharge: HOME | DRG: 167 | End: 2017-08-23
Attending: THORACIC SURGERY (CARDIOTHORACIC VASCULAR SURGERY) | Admitting: THORACIC SURGERY (CARDIOTHORACIC VASCULAR SURGERY)
Payer: COMMERCIAL

## 2017-08-22 ENCOUNTER — ANESTHESIA (OUTPATIENT)
Dept: SURGERY | Facility: CLINIC | Age: 57
DRG: 167 | End: 2017-08-22
Payer: COMMERCIAL

## 2017-08-22 DIAGNOSIS — R91.1 NODULE OF RIGHT LUNG: Primary | ICD-10-CM

## 2017-08-22 LAB
ABO + RH BLD: NORMAL
ABO + RH BLD: NORMAL
ANION GAP SERPL CALCULATED.3IONS-SCNC: 7 MMOL/L (ref 3–14)
BLD GP AB SCN SERPL QL: NORMAL
BLOOD BANK CMNT PATIENT-IMP: NORMAL
BUN SERPL-MCNC: 13 MG/DL (ref 7–30)
CALCIUM SERPL-MCNC: 8.5 MG/DL (ref 8.5–10.1)
CHLORIDE SERPL-SCNC: 106 MMOL/L (ref 94–109)
CO2 SERPL-SCNC: 28 MMOL/L (ref 20–32)
CREAT SERPL-MCNC: 0.82 MG/DL (ref 0.66–1.25)
ERYTHROCYTE [DISTWIDTH] IN BLOOD BY AUTOMATED COUNT: 15.2 % (ref 10–15)
GFR SERPL CREATININE-BSD FRML MDRD: >90 ML/MIN/1.7M2
GLUCOSE SERPL-MCNC: 97 MG/DL (ref 70–99)
HCT VFR BLD AUTO: 40.8 % (ref 40–53)
HGB BLD-MCNC: 14 G/DL (ref 13.3–17.7)
INR PPP: 0.96 (ref 0.86–1.14)
MCH RBC QN AUTO: 31.8 PG (ref 26.5–33)
MCHC RBC AUTO-ENTMCNC: 34.3 G/DL (ref 31.5–36.5)
MCV RBC AUTO: 93 FL (ref 78–100)
PLATELET # BLD AUTO: 165 10E9/L (ref 150–450)
POTASSIUM SERPL-SCNC: 3.7 MMOL/L (ref 3.4–5.3)
RBC # BLD AUTO: 4.4 10E12/L (ref 4.4–5.9)
SODIUM SERPL-SCNC: 141 MMOL/L (ref 133–144)
SPECIMEN EXP DATE BLD: NORMAL
WBC # BLD AUTO: 4.1 10E9/L (ref 4–11)

## 2017-08-22 PROCEDURE — 25000132 ZZH RX MED GY IP 250 OP 250 PS 637: Performed by: PHYSICIAN ASSISTANT

## 2017-08-22 PROCEDURE — 88331 PATH CONSLTJ SURG 1 BLK 1SPC: CPT | Mod: 26 | Performed by: THORACIC SURGERY (CARDIOTHORACIC VASCULAR SURGERY)

## 2017-08-22 PROCEDURE — 27210794 ZZH OR GENERAL SUPPLY STERILE: Performed by: THORACIC SURGERY (CARDIOTHORACIC VASCULAR SURGERY)

## 2017-08-22 PROCEDURE — 71000012 ZZH RECOVERY PHASE 1 LEVEL 1 FIRST HR: Performed by: THORACIC SURGERY (CARDIOTHORACIC VASCULAR SURGERY)

## 2017-08-22 PROCEDURE — 85610 PROTHROMBIN TIME: CPT | Performed by: THORACIC SURGERY (CARDIOTHORACIC VASCULAR SURGERY)

## 2017-08-22 PROCEDURE — 36000093 ZZH SURGERY LEVEL 4 1ST 30 MIN: Performed by: THORACIC SURGERY (CARDIOTHORACIC VASCULAR SURGERY)

## 2017-08-22 PROCEDURE — 12000007 ZZH R&B INTERMEDIATE

## 2017-08-22 PROCEDURE — 25000125 ZZHC RX 250: Performed by: THORACIC SURGERY (CARDIOTHORACIC VASCULAR SURGERY)

## 2017-08-22 PROCEDURE — 88307 TISSUE EXAM BY PATHOLOGIST: CPT | Performed by: THORACIC SURGERY (CARDIOTHORACIC VASCULAR SURGERY)

## 2017-08-22 PROCEDURE — 0BBC4ZX EXCISION OF RIGHT UPPER LUNG LOBE, PERCUTANEOUS ENDOSCOPIC APPROACH, DIAGNOSTIC: ICD-10-PCS | Performed by: THORACIC SURGERY (CARDIOTHORACIC VASCULAR SURGERY)

## 2017-08-22 PROCEDURE — 25000128 H RX IP 250 OP 636: Performed by: SURGERY

## 2017-08-22 PROCEDURE — 25000125 ZZHC RX 250: Performed by: SURGERY

## 2017-08-22 PROCEDURE — 86900 BLOOD TYPING SEROLOGIC ABO: CPT | Performed by: THORACIC SURGERY (CARDIOTHORACIC VASCULAR SURGERY)

## 2017-08-22 PROCEDURE — 25000128 H RX IP 250 OP 636: Performed by: THORACIC SURGERY (CARDIOTHORACIC VASCULAR SURGERY)

## 2017-08-22 PROCEDURE — 25000125 ZZHC RX 250: Performed by: NURSE ANESTHETIST, CERTIFIED REGISTERED

## 2017-08-22 PROCEDURE — 37000008 ZZH ANESTHESIA TECHNICAL FEE, 1ST 30 MIN: Performed by: THORACIC SURGERY (CARDIOTHORACIC VASCULAR SURGERY)

## 2017-08-22 PROCEDURE — 40000986 XR CHEST PORT 1 VW

## 2017-08-22 PROCEDURE — 25000566 ZZH SEVOFLURANE, EA 15 MIN: Performed by: THORACIC SURGERY (CARDIOTHORACIC VASCULAR SURGERY)

## 2017-08-22 PROCEDURE — 40000170 ZZH STATISTIC PRE-PROCEDURE ASSESSMENT II: Performed by: THORACIC SURGERY (CARDIOTHORACIC VASCULAR SURGERY)

## 2017-08-22 PROCEDURE — 36000063 ZZH SURGERY LEVEL 4 EA 15 ADDTL MIN: Performed by: THORACIC SURGERY (CARDIOTHORACIC VASCULAR SURGERY)

## 2017-08-22 PROCEDURE — 37000009 ZZH ANESTHESIA TECHNICAL FEE, EACH ADDTL 15 MIN: Performed by: THORACIC SURGERY (CARDIOTHORACIC VASCULAR SURGERY)

## 2017-08-22 PROCEDURE — 85027 COMPLETE CBC AUTOMATED: CPT | Performed by: THORACIC SURGERY (CARDIOTHORACIC VASCULAR SURGERY)

## 2017-08-22 PROCEDURE — 25000128 H RX IP 250 OP 636

## 2017-08-22 PROCEDURE — 36415 COLL VENOUS BLD VENIPUNCTURE: CPT | Performed by: THORACIC SURGERY (CARDIOTHORACIC VASCULAR SURGERY)

## 2017-08-22 PROCEDURE — 88331 PATH CONSLTJ SURG 1 BLK 1SPC: CPT | Performed by: THORACIC SURGERY (CARDIOTHORACIC VASCULAR SURGERY)

## 2017-08-22 PROCEDURE — 25000128 H RX IP 250 OP 636: Performed by: ANESTHESIOLOGY

## 2017-08-22 PROCEDURE — 25000128 H RX IP 250 OP 636: Performed by: PHYSICIAN ASSISTANT

## 2017-08-22 PROCEDURE — 86901 BLOOD TYPING SEROLOGIC RH(D): CPT | Performed by: THORACIC SURGERY (CARDIOTHORACIC VASCULAR SURGERY)

## 2017-08-22 PROCEDURE — 27210995 ZZH RX 272: Performed by: THORACIC SURGERY (CARDIOTHORACIC VASCULAR SURGERY)

## 2017-08-22 PROCEDURE — S0020 INJECTION, BUPIVICAINE HYDRO: HCPCS | Performed by: THORACIC SURGERY (CARDIOTHORACIC VASCULAR SURGERY)

## 2017-08-22 PROCEDURE — 86850 RBC ANTIBODY SCREEN: CPT | Performed by: THORACIC SURGERY (CARDIOTHORACIC VASCULAR SURGERY)

## 2017-08-22 PROCEDURE — 88307 TISSUE EXAM BY PATHOLOGIST: CPT | Mod: 26 | Performed by: THORACIC SURGERY (CARDIOTHORACIC VASCULAR SURGERY)

## 2017-08-22 PROCEDURE — 25000128 H RX IP 250 OP 636: Performed by: NURSE ANESTHETIST, CERTIFIED REGISTERED

## 2017-08-22 PROCEDURE — 80048 BASIC METABOLIC PNL TOTAL CA: CPT | Performed by: THORACIC SURGERY (CARDIOTHORACIC VASCULAR SURGERY)

## 2017-08-22 RX ORDER — CALCIUM CARBONATE 500 MG/1
500-1000 TABLET, CHEWABLE ORAL 4 TIMES DAILY PRN
Status: DISCONTINUED | OUTPATIENT
Start: 2017-08-22 | End: 2017-08-23 | Stop reason: HOSPADM

## 2017-08-22 RX ORDER — HYDROMORPHONE HYDROCHLORIDE 1 MG/ML
INJECTION, SOLUTION INTRAMUSCULAR; INTRAVENOUS; SUBCUTANEOUS
Status: COMPLETED
Start: 2017-08-22 | End: 2017-08-22

## 2017-08-22 RX ORDER — BUPIVACAINE HYDROCHLORIDE 5 MG/ML
INJECTION, SOLUTION PERINEURAL PRN
Status: DISCONTINUED | OUTPATIENT
Start: 2017-08-22 | End: 2017-08-22 | Stop reason: HOSPADM

## 2017-08-22 RX ORDER — GINSENG 100 MG
CAPSULE ORAL 3 TIMES DAILY
Status: DISCONTINUED | OUTPATIENT
Start: 2017-08-22 | End: 2017-08-23 | Stop reason: HOSPADM

## 2017-08-22 RX ORDER — NALOXONE HYDROCHLORIDE 0.4 MG/ML
.1-.4 INJECTION, SOLUTION INTRAMUSCULAR; INTRAVENOUS; SUBCUTANEOUS
Status: DISCONTINUED | OUTPATIENT
Start: 2017-08-22 | End: 2017-08-23 | Stop reason: HOSPADM

## 2017-08-22 RX ORDER — CEFAZOLIN SODIUM 2 G/100ML
2 INJECTION, SOLUTION INTRAVENOUS
Status: COMPLETED | OUTPATIENT
Start: 2017-08-22 | End: 2017-08-22

## 2017-08-22 RX ORDER — ACETAMINOPHEN 325 MG/1
975 TABLET ORAL EVERY 8 HOURS
Status: DISCONTINUED | OUTPATIENT
Start: 2017-08-22 | End: 2017-08-23 | Stop reason: HOSPADM

## 2017-08-22 RX ORDER — AMOXICILLIN 250 MG
1-2 CAPSULE ORAL 2 TIMES DAILY PRN
Status: DISCONTINUED | OUTPATIENT
Start: 2017-08-22 | End: 2017-08-23 | Stop reason: HOSPADM

## 2017-08-22 RX ORDER — ONDANSETRON 2 MG/ML
4 INJECTION INTRAMUSCULAR; INTRAVENOUS EVERY 30 MIN PRN
Status: DISCONTINUED | OUTPATIENT
Start: 2017-08-22 | End: 2017-08-22 | Stop reason: HOSPADM

## 2017-08-22 RX ORDER — LIDOCAINE 40 MG/G
CREAM TOPICAL
Status: DISCONTINUED | OUTPATIENT
Start: 2017-08-22 | End: 2017-08-23 | Stop reason: HOSPADM

## 2017-08-22 RX ORDER — OXYCODONE HYDROCHLORIDE 5 MG/1
5-10 TABLET ORAL
Status: DISCONTINUED | OUTPATIENT
Start: 2017-08-22 | End: 2017-08-23 | Stop reason: HOSPADM

## 2017-08-22 RX ORDER — ACETAMINOPHEN 325 MG/1
650 TABLET ORAL EVERY 4 HOURS PRN
Status: DISCONTINUED | OUTPATIENT
Start: 2017-08-25 | End: 2017-08-23 | Stop reason: HOSPADM

## 2017-08-22 RX ORDER — HYDROMORPHONE HYDROCHLORIDE 1 MG/ML
.3-.5 INJECTION, SOLUTION INTRAMUSCULAR; INTRAVENOUS; SUBCUTANEOUS
Status: DISCONTINUED | OUTPATIENT
Start: 2017-08-22 | End: 2017-08-23 | Stop reason: HOSPADM

## 2017-08-22 RX ORDER — SODIUM CHLORIDE, SODIUM LACTATE, POTASSIUM CHLORIDE, CALCIUM CHLORIDE 600; 310; 30; 20 MG/100ML; MG/100ML; MG/100ML; MG/100ML
INJECTION, SOLUTION INTRAVENOUS CONTINUOUS
Status: DISCONTINUED | OUTPATIENT
Start: 2017-08-22 | End: 2017-08-22 | Stop reason: HOSPADM

## 2017-08-22 RX ORDER — ONDANSETRON 4 MG/1
4 TABLET, ORALLY DISINTEGRATING ORAL EVERY 6 HOURS PRN
Status: DISCONTINUED | OUTPATIENT
Start: 2017-08-22 | End: 2017-08-23 | Stop reason: HOSPADM

## 2017-08-22 RX ORDER — MAGNESIUM HYDROXIDE 1200 MG/15ML
LIQUID ORAL PRN
Status: DISCONTINUED | OUTPATIENT
Start: 2017-08-22 | End: 2017-08-22 | Stop reason: HOSPADM

## 2017-08-22 RX ORDER — AMOXICILLIN 250 MG
1-2 CAPSULE ORAL 2 TIMES DAILY
Status: DISCONTINUED | OUTPATIENT
Start: 2017-08-22 | End: 2017-08-22

## 2017-08-22 RX ORDER — PROPOFOL 10 MG/ML
INJECTION, EMULSION INTRAVENOUS PRN
Status: DISCONTINUED | OUTPATIENT
Start: 2017-08-22 | End: 2017-08-22

## 2017-08-22 RX ORDER — HYDROMORPHONE HYDROCHLORIDE 1 MG/ML
.3-.5 INJECTION, SOLUTION INTRAMUSCULAR; INTRAVENOUS; SUBCUTANEOUS EVERY 5 MIN PRN
Status: DISCONTINUED | OUTPATIENT
Start: 2017-08-22 | End: 2017-08-22 | Stop reason: HOSPADM

## 2017-08-22 RX ORDER — IBUPROFEN 600 MG/1
600 TABLET, FILM COATED ORAL EVERY 6 HOURS PRN
Status: DISCONTINUED | OUTPATIENT
Start: 2017-08-23 | End: 2017-08-23 | Stop reason: HOSPADM

## 2017-08-22 RX ORDER — AMOXICILLIN 250 MG
1-2 CAPSULE ORAL 2 TIMES DAILY
Status: DISCONTINUED | OUTPATIENT
Start: 2017-08-22 | End: 2017-08-23 | Stop reason: HOSPADM

## 2017-08-22 RX ORDER — DIPHENHYDRAMINE HCL 25 MG
25 CAPSULE ORAL EVERY 6 HOURS PRN
Status: DISCONTINUED | OUTPATIENT
Start: 2017-08-22 | End: 2017-08-23 | Stop reason: HOSPADM

## 2017-08-22 RX ORDER — LIDOCAINE HYDROCHLORIDE 20 MG/ML
INJECTION, SOLUTION INFILTRATION; PERINEURAL PRN
Status: DISCONTINUED | OUTPATIENT
Start: 2017-08-22 | End: 2017-08-22

## 2017-08-22 RX ORDER — CEFAZOLIN SODIUM 1 G/3ML
1 INJECTION, POWDER, FOR SOLUTION INTRAMUSCULAR; INTRAVENOUS SEE ADMIN INSTRUCTIONS
Status: DISCONTINUED | OUTPATIENT
Start: 2017-08-22 | End: 2017-08-22 | Stop reason: HOSPADM

## 2017-08-22 RX ORDER — NEOSTIGMINE METHYLSULFATE 1 MG/ML
VIAL (ML) INJECTION PRN
Status: DISCONTINUED | OUTPATIENT
Start: 2017-08-22 | End: 2017-08-22

## 2017-08-22 RX ORDER — FENTANYL CITRATE 50 UG/ML
25-50 INJECTION, SOLUTION INTRAMUSCULAR; INTRAVENOUS
Status: DISCONTINUED | OUTPATIENT
Start: 2017-08-22 | End: 2017-08-22 | Stop reason: HOSPADM

## 2017-08-22 RX ORDER — DEXAMETHASONE SODIUM PHOSPHATE 4 MG/ML
INJECTION, SOLUTION INTRA-ARTICULAR; INTRALESIONAL; INTRAMUSCULAR; INTRAVENOUS; SOFT TISSUE PRN
Status: DISCONTINUED | OUTPATIENT
Start: 2017-08-22 | End: 2017-08-22

## 2017-08-22 RX ORDER — DIPHENHYDRAMINE HYDROCHLORIDE 50 MG/ML
25 INJECTION INTRAMUSCULAR; INTRAVENOUS EVERY 6 HOURS PRN
Status: DISCONTINUED | OUTPATIENT
Start: 2017-08-22 | End: 2017-08-23 | Stop reason: HOSPADM

## 2017-08-22 RX ORDER — FENTANYL CITRATE 50 UG/ML
INJECTION, SOLUTION INTRAMUSCULAR; INTRAVENOUS PRN
Status: DISCONTINUED | OUTPATIENT
Start: 2017-08-22 | End: 2017-08-22

## 2017-08-22 RX ORDER — ONDANSETRON 2 MG/ML
4 INJECTION INTRAMUSCULAR; INTRAVENOUS EVERY 6 HOURS PRN
Status: DISCONTINUED | OUTPATIENT
Start: 2017-08-22 | End: 2017-08-23 | Stop reason: HOSPADM

## 2017-08-22 RX ORDER — BISACODYL 10 MG
10 SUPPOSITORY, RECTAL RECTAL DAILY PRN
Status: DISCONTINUED | OUTPATIENT
Start: 2017-08-22 | End: 2017-08-23 | Stop reason: HOSPADM

## 2017-08-22 RX ORDER — PROCHLORPERAZINE MALEATE 5 MG
5-10 TABLET ORAL EVERY 6 HOURS PRN
Status: DISCONTINUED | OUTPATIENT
Start: 2017-08-22 | End: 2017-08-23 | Stop reason: HOSPADM

## 2017-08-22 RX ORDER — SODIUM CHLORIDE 9 MG/ML
INJECTION, SOLUTION INTRAVENOUS CONTINUOUS
Status: DISCONTINUED | OUTPATIENT
Start: 2017-08-22 | End: 2017-08-22

## 2017-08-22 RX ORDER — ONDANSETRON 2 MG/ML
INJECTION INTRAMUSCULAR; INTRAVENOUS PRN
Status: DISCONTINUED | OUTPATIENT
Start: 2017-08-22 | End: 2017-08-22

## 2017-08-22 RX ORDER — KETOROLAC TROMETHAMINE 30 MG/ML
30 INJECTION, SOLUTION INTRAMUSCULAR; INTRAVENOUS EVERY 6 HOURS
Status: COMPLETED | OUTPATIENT
Start: 2017-08-22 | End: 2017-08-23

## 2017-08-22 RX ORDER — GLYCOPYRROLATE 0.2 MG/ML
INJECTION, SOLUTION INTRAMUSCULAR; INTRAVENOUS PRN
Status: DISCONTINUED | OUTPATIENT
Start: 2017-08-22 | End: 2017-08-22

## 2017-08-22 RX ORDER — ONDANSETRON 4 MG/1
4 TABLET, ORALLY DISINTEGRATING ORAL EVERY 30 MIN PRN
Status: DISCONTINUED | OUTPATIENT
Start: 2017-08-22 | End: 2017-08-22 | Stop reason: HOSPADM

## 2017-08-22 RX ADMIN — KETOROLAC TROMETHAMINE 30 MG: 30 INJECTION, SOLUTION INTRAMUSCULAR at 10:43

## 2017-08-22 RX ADMIN — OXYCODONE HYDROCHLORIDE 10 MG: 5 TABLET ORAL at 15:41

## 2017-08-22 RX ADMIN — ONDANSETRON 4 MG: 2 INJECTION INTRAMUSCULAR; INTRAVENOUS at 09:23

## 2017-08-22 RX ADMIN — HYDROMORPHONE HYDROCHLORIDE 0.5 MG: 1 INJECTION, SOLUTION INTRAMUSCULAR; INTRAVENOUS; SUBCUTANEOUS at 14:13

## 2017-08-22 RX ADMIN — KETOROLAC TROMETHAMINE 30 MG: 30 INJECTION, SOLUTION INTRAMUSCULAR at 22:21

## 2017-08-22 RX ADMIN — FENTANYL CITRATE 50 MCG: 50 INJECTION, SOLUTION INTRAMUSCULAR; INTRAVENOUS at 09:19

## 2017-08-22 RX ADMIN — LIDOCAINE HYDROCHLORIDE 60 MG: 20 INJECTION, SOLUTION INFILTRATION; PERINEURAL at 08:59

## 2017-08-22 RX ADMIN — FENTANYL CITRATE 100 MCG: 50 INJECTION, SOLUTION INTRAMUSCULAR; INTRAVENOUS at 08:59

## 2017-08-22 RX ADMIN — HYDROMORPHONE HYDROCHLORIDE 0.5 MG: 1 INJECTION, SOLUTION INTRAMUSCULAR; INTRAVENOUS; SUBCUTANEOUS at 12:26

## 2017-08-22 RX ADMIN — SENNOSIDES AND DOCUSATE SODIUM 1 TABLET: 8.6; 5 TABLET ORAL at 13:16

## 2017-08-22 RX ADMIN — HYDROMORPHONE HYDROCHLORIDE 0.5 MG: 1 INJECTION, SOLUTION INTRAMUSCULAR; INTRAVENOUS; SUBCUTANEOUS at 10:38

## 2017-08-22 RX ADMIN — KETOROLAC TROMETHAMINE 30 MG: 30 INJECTION, SOLUTION INTRAMUSCULAR at 17:07

## 2017-08-22 RX ADMIN — FENTANYL CITRATE 25 MCG: 50 INJECTION, SOLUTION INTRAMUSCULAR; INTRAVENOUS at 09:56

## 2017-08-22 RX ADMIN — OXYCODONE HYDROCHLORIDE 10 MG: 5 TABLET ORAL at 19:02

## 2017-08-22 RX ADMIN — FENTANYL CITRATE 50 MCG: 50 INJECTION, SOLUTION INTRAMUSCULAR; INTRAVENOUS at 10:14

## 2017-08-22 RX ADMIN — HYDROMORPHONE HYDROCHLORIDE 0.5 MG: 1 INJECTION, SOLUTION INTRAMUSCULAR; INTRAVENOUS; SUBCUTANEOUS at 10:22

## 2017-08-22 RX ADMIN — MIDAZOLAM HYDROCHLORIDE 2 MG: 1 INJECTION, SOLUTION INTRAMUSCULAR; INTRAVENOUS at 08:53

## 2017-08-22 RX ADMIN — ROCURONIUM BROMIDE 50 MG: 10 INJECTION INTRAVENOUS at 08:59

## 2017-08-22 RX ADMIN — PROPOFOL 100 MG: 10 INJECTION, EMULSION INTRAVENOUS at 09:05

## 2017-08-22 RX ADMIN — NEOSTIGMINE METHYLSULFATE 5 MG: 1 INJECTION INTRAMUSCULAR; INTRAVENOUS; SUBCUTANEOUS at 09:57

## 2017-08-22 RX ADMIN — ACETAMINOPHEN 975 MG: 325 TABLET, FILM COATED ORAL at 13:16

## 2017-08-22 RX ADMIN — GLYCOPYRROLATE 0.8 MG: 0.2 INJECTION, SOLUTION INTRAMUSCULAR; INTRAVENOUS at 09:57

## 2017-08-22 RX ADMIN — CEFAZOLIN SODIUM 2 G: 2 INJECTION, SOLUTION INTRAVENOUS at 09:10

## 2017-08-22 RX ADMIN — ACETAMINOPHEN 975 MG: 325 TABLET, FILM COATED ORAL at 21:04

## 2017-08-22 RX ADMIN — SODIUM CHLORIDE, POTASSIUM CHLORIDE, SODIUM LACTATE AND CALCIUM CHLORIDE: 600; 310; 30; 20 INJECTION, SOLUTION INTRAVENOUS at 09:53

## 2017-08-22 RX ADMIN — LIDOCAINE HYDROCHLORIDE 0.2 ML: 10 INJECTION, SOLUTION EPIDURAL; INFILTRATION; INTRACAUDAL; PERINEURAL at 08:40

## 2017-08-22 RX ADMIN — SENNOSIDES AND DOCUSATE SODIUM 1 TABLET: 8.6; 5 TABLET ORAL at 21:04

## 2017-08-22 RX ADMIN — OXYCODONE HYDROCHLORIDE 10 MG: 5 TABLET ORAL at 22:20

## 2017-08-22 RX ADMIN — SODIUM CHLORIDE, POTASSIUM CHLORIDE, SODIUM LACTATE AND CALCIUM CHLORIDE: 600; 310; 30; 20 INJECTION, SOLUTION INTRAVENOUS at 08:41

## 2017-08-22 RX ADMIN — DEXAMETHASONE SODIUM PHOSPHATE 4 MG: 4 INJECTION, SOLUTION INTRA-ARTICULAR; INTRALESIONAL; INTRAMUSCULAR; INTRAVENOUS; SOFT TISSUE at 08:59

## 2017-08-22 RX ADMIN — DEXMEDETOMIDINE HYDROCHLORIDE 12 MCG: 100 INJECTION, SOLUTION INTRAVENOUS at 09:53

## 2017-08-22 RX ADMIN — PROPOFOL 200 MG: 10 INJECTION, EMULSION INTRAVENOUS at 08:59

## 2017-08-22 ASSESSMENT — COPD QUESTIONNAIRES: COPD: 0

## 2017-08-22 NOTE — BRIEF OP NOTE
[facility] Brief Operative Note    Pre-operative diagnosis: RIGHT UPPER LOBE LUNG NODULE    Post-operative diagnosis right upper lobe lung nodule, history of metastatic rectal cancer   Procedure: Procedure(s):  RIGHT VIDEO ASSISTED THORACOSCOPY, RIGHT UPPER LOBE LUNG NODULE - Wound Class: I-Clean   Surgeon(s): Surgeon(s) and Role:     * Fritz Pierce MD - Primary     * Meme Layton PA-C - Assisting   Estimated blood loss: 10 cc   Specimens:   ID Type Source Tests Collected by Time Destination   A : RIGHT UPPER LOBE LUNG NODULE  Tissue Lung, Right Upper Lobe SURGICAL PATHOLOGY EXAM Fritz Pierce MD 8/22/2017  9:34 AM       Findings: Frozen section of the RUL lung nodule are suspicious for metastatic rectal cancer

## 2017-08-22 NOTE — OP NOTE
DATE OF PROCEDURE:  08/22/2017       ASSISTANT:  Meme Layton PA-C      PREOPERATIVE DIAGNOSES:     1.  Right upper lobe lung nodule.   2.  History of metastatic rectal cancer.      POSTOPERATIVE DIAGNOSES:     1.  Right upper lobe lung nodule.   2.  History of metastatic rectal cancer.      PROCEDURE:  Right video-assisted thoracoscopy and wedge resection of right upper lobe lung nodule.      ANESTHESIA:  General.      INDICATIONS:  Daniele Koroma is a 57-year-old gentleman with a history of metastatic rectal cancer.  The most recent scan shows a new, approximately 1 cm nodule in the right upper lobe lung.  There is also a liver lesion.  This is the only site of the suspected metastatic disease.  Based on the findings, a right video-assisted thoracoscopy, possible thoracotomy and wedge resection is indicated for diagnosis and treatment.      DESCRIPTION OF PROCEDURE:  The patient was brought to the operating room and placed in supine position.  Under general anesthesia with double-lumen endotracheal tube, the patient was placed in the left lateral decubitus position.  The right chest was prepared and draped in the usual fashion using ChloraPrep.        Thoracoscopic incision was made in one of the port that was used during the robotic right lower lobectomy the patient underwent the past.  The pleural space was entered.  There were no significant adhesions.  Two other thoracoscopic incisions were made in the usual fashion.  The lung was examined.  The nodule was clearly identified.  Using multiple applications of [place] 60 mm gold stapling device, wedge resection was done with excellent margin.  The specimen was placed in an EndoCatch bag and sent for frozen section.  This revealed metastatic rectal cancer.  Hemostasis was verified and was excellent.  Through a separate stab wound, a 24 Czech chest tube was placed with the tip directed toward the apex posteriorly and sutured to the skin with 2-0 silk suture.  Then  the three thoracoscopic incisions were closed in the usual fashion.  Thirty mL Marcaine 0.5% without epinephrine was injected as intercostal blocks.        ESTIMATED BLOOD LOSS:  Minimal.        SPONGE AND NEEDLE COUNTS:  Correct.      Meme Layton PA-C, was the first assistant during the procedure.  Her role as first assistant was essential and necessary in accomplishing each step of the procedure as described above, providing handling of the scope, exposure and retraction.         GEO PERDOMO MD             D: 2017 10:24   T: 2017 11:48   MT: [id number]      Name:     YAZAN PINEDA   MRN:      [id number]        Account:        [id number]   :      1960           Procedure Date: 2017      Document: [id number]

## 2017-08-22 NOTE — IP AVS SNAPSHOT
MRN:[id number]                      After Visit Summary   8/22/2017    Daniele Koroma    MRN: [id number]           Thank you!     Thank you for choosing [facility] for your care. Our goal is always to provide you with excellent care. Hearing back from our patients is one way we can continue to improve our services. Please take a few minutes to complete the written survey that you may receive in the mail after you visit with us. Thank you!        Patient Information     Date Of Birth          1960        Designated Caregiver       Most Recent Value    Caregiver    Will someone help with your care after discharge? no [pt primary-- wife help prn]      About your hospital stay     You were admitted on:  August 22, 2017 You last received care in the:  [place] [address] [facility]    You were discharged on:  August 23, 2017       Who to Call     For medical emergencies, please call 911.  For non-urgent questions about your medical care, please call your primary care provider or clinic, [phone]  For questions related to your surgery, please call your surgery clinic        Attending Provider     Provider Fritz Hyatt MD Thoracic Diseases       Primary Care Provider Office Phone # Fax #    Jennifer Doris Rodriguez [phone]      Further instructions from your care team       [facility]  Discharge Orders & Follow-up Care  Video-Assisted: Thoracoscopy - Thoracotomy    Call Millie at Dr. Pierce  office at [phone] to make a return appointment with a chest x-ray on Wednesday 08-30  Your appointment will be with either Meme Layton PA-C or Shana Saucedo PA-C, or Dr. Pierce.      Our office is located at:  [facility], [address] [place] [address], [address] [address].  Millie will explain where to park when you call for an appointment.    A. Patient Care  Call Dr Pierce  office @ [phone] if:  ? Severe  "chills or a fever or 100.4 F.  temperature on two occasions  ? Increased incisional pain and/or redness  ? Presence of unusual incisional or chest tube site drainage  ? Coughing up bright red blood or greenish-yellow secretions  ? Significant increase in shortness of breath    Pain Relief  You have been given a prescription for narcotic pain medicine.  You may also take ibuprofen and acetaminophen over the counter.  Recommended dosages are:  600 mg Ibuprofen every 6 hours as needed and 650 mg Acetaminophen every 4 hours as needed.  Many patients get good pain relief by \"staggering\" these medications.     No driving while on narcotics.     Activity  XXX  No activity restrictions for a scope procedure/thoracoscopy  ___ No heavy lifting greater than 8-10 pounds on the operative side for 4-6 weeks for a thoracotomy    Wound Care  Remove all dressings in 48 hours and then you may shower.  Wash the incision and chest tube site(s) daily with soap and water. No bathing or immersing incision underwater for approximately 2 weeks or until the chest tube sites are completely healed.     Place a dry gauze dressing over the chest tube site(s) because it(they) will drain a few days.  Do not be alarmed if there is drainage of a large amount of fluid (should be pink or yellow-- or somewhat bloody) either spontaneously or with coughing or exertion. If this happens, just place a large dry gauze dressing over the chest tube site-- it will stop and scab over in about a week or so. Once the drainage stops, then leave the chest tube site(s) open to air.     White steri strips will be present on the incision(s). They will peel off within about 10 days-- otherwise, they will be removed at your post-op appointment.                   Pending Results     Date and Time Order Name Status Description    8/22/2017 0935 Surgical pathology exam In process             Admission Information     Date & Time Provider Department Dept. Phone    8/22/2017 " "Fritz Pierce MD [place] [address] [facility] [phone]      Your Vitals Were     Blood Pressure Pulse Temperature Respirations Height Weight    107/70 (BP Location: Left arm) 75 98.2  F (36.8  C) (Oral) 16 1.778 m (5' 10\") 69.4 kg (153 lb)    Pulse Oximetry BMI (Body Mass Index)                96% 21.95 kg/m2          [employer] Information     [employer] lets you send messages to your doctor, view your test results, renew your prescriptions, schedule appointments and more. To sign up, go to [email or web address] . Click on \"Log in\" on the left side of the screen, which will take you to the Welcome page. Then click on \"Sign up Now\" on the right side of the page.     You will be asked to enter the access code listed below, as well as some personal information. Please follow the directions to create your username and password.     Your access code is: [email or web address]  Expires: 2017  9:12 AM     Your access code will  in 90 days. If you need help or a new code, please call your [place] clinic or [phone].        Care EveryWhere ID     This is your Care EveryWhere ID. This could be used by other organizations to access your [place] medical records  [id number]        Equal Access to Services     [facility]: Hadfeli vazquez Sojena, waaxda luqadaha, qaybta kaalscot coello, eileen garcia. So [facility] [phone].    ATENCIÓN: Si habla español, tiene a quiles disposición servicios gratuitos de asistencia lingüística. [facility] [phone].    We comply with applicable federal civil rights laws and [place] laws. We do not discriminate on the basis of race, color, national origin, age, disability sex, sexual orientation or gender identity.               Review of your medicines      START taking        Dose / Directions    oxyCODONE 5 MG IR tablet   Commonly known as:  ROXICODONE   Used for:  Nodule of right lung        Dose:  5-10 mg   Take 1-2 " tablets (5-10 mg) by mouth every 3 hours as needed for moderate to severe pain   Quantity:  40 tablet   Refills:  0         CONTINUE these medicines which have NOT CHANGED        Dose / Directions    LORAZEPAM PO        Dose:  0.5 mg   Take 0.5 mg by mouth every 4 hours as needed for anxiety or nausea (with chemo)   Refills:  0       OLANZAPINE PO        Dose:  5 mg   Take 5 mg by mouth daily   Refills:  0       order for DME   Used for:  Rectal cancer metastasised to intrapelvic lymph node, Colostomy in place (H)        Equipment being ordered: Other: ostomy supplies 1 or 2 piece barrier and pouch, deodorizing drops and spray; ostomy rings, ostomy paste Treatment Diagnosis: Rectal cancer   Quantity:  1 Device   Refills:  3       PROCHLORPERAZINE MALEATE PO        Dose:  10 mg   Take 10 mg by mouth every 6 hours as needed for nausea or vomiting   Refills:  0            Where to get your medicines      Some of these will need a paper prescription and others can be bought over the counter. Ask your nurse if you have questions.     Bring a paper prescription for each of these medications     oxyCODONE 5 MG IR tablet                Protect others around you: Learn how to safely use, store and throw away your medicines at www.disposemymeds.org.             Medication List: This is a list of all your medications and when to take them. Check marks below indicate your daily home schedule. Keep this list as a reference.      Medications           Morning Afternoon Evening Bedtime As Needed    LORAZEPAM PO   Take 0.5 mg by mouth every 4 hours as needed for anxiety or nausea (with chemo)                                   OLANZAPINE PO   Take 5 mg by mouth daily   Next Dose Due:  Resume tomorrow 8/24/2017                                   order for DME   Equipment being ordered: Other: ostomy supplies 1 or 2 piece barrier and pouch, deodorizing drops and spray; ostomy rings, ostomy paste Treatment Diagnosis: Rectal cancer                                 oxyCODONE 5 MG IR tablet   Commonly known as:  ROXICODONE   Take 1-2 tablets (5-10 mg) by mouth every 3 hours as needed for moderate to severe pain   Last time this was given:  10 mg on 8/23/2017  8:16 AM   Next Dose Due:  Next available 11:15am                                   PROCHLORPERAZINE MALEATE PO   Take 10 mg by mouth every 6 hours as needed for nausea or vomiting

## 2017-08-22 NOTE — IP AVS SNAPSHOT
[place] [address] [facility]    [address] [place] [address]    Phone:  [phone]                                       After Visit Summary   8/22/2017    Daniele Koroma    MRN: [id number]           After Visit Summary Signature Page     I have received my discharge instructions, and my questions have been answered. I have discussed any challenges I see with this plan with the nurse or doctor.    ..........................................................................................................................................  Patient/Patient Representative Signature      ..........................................................................................................................................  Patient Representative Print Name and Relationship to Patient    ..................................................               ................................................  Date                                            Time    ..........................................................................................................................................  Reviewed by Signature/Title    ...................................................              ..............................................  Date                                                            Time

## 2017-08-22 NOTE — ANESTHESIA CARE TRANSFER NOTE
Patient: Daniele Koroma    Procedure(s):  RIGHT VIDEO ASSISTED THORACOSCOPY, RIGHT UPPER LOBE LUNG NODULE - Wound Class: I-Clean    Diagnosis: RIGHT UPPER LOBE LUNG NODUEL   Diagnosis Additional Information: No value filed.    Anesthesia Type:   General, ETT     Note:  Airway :Face Mask  Patient transferred to:PACU  Comments: VSS      Vitals: (Last set prior to Anesthesia Care Transfer)    CRNA VITALS  8/22/2017 0940 - 8/22/2017 1016      8/22/2017             Pulse: 87    SpO2: 100 %    Resp Rate (set): 10                Electronically Signed By: NOLAN [facility]  August 22, 2017  10:16 AM

## 2017-08-22 NOTE — ANESTHESIA PREPROCEDURE EVALUATION
Anesthesia Evaluation     . Pt has had prior anesthetic. Type: General    No history of anesthetic complications          ROS/MED HX    ENT/Pulmonary: Comment: Rectal adenocarcinoma with METS to lung and liver    (+), . Other pulmonary disease frequent THC use.   (-) asthma, COPD and sleep apnea   Neurologic:     (+)neuropathy     Cardiovascular:  - neg cardiovascular ROS       METS/Exercise Tolerance:  >4 METS   Hematologic: Comments: Neutropenia 2/2 chemo        Musculoskeletal: Comment: Recent fall with facial trauma including multiple fractures, concussion and R elbow fracture        GI/Hepatic:     (+) GERD Asymptomatic on medication,       Renal/Genitourinary:         Endo:         Psychiatric:         Infectious Disease:         Malignancy:         Other:                     Physical Exam  Normal systems: dental    Airway   Mallampati: II  TM distance: >3 FB  Neck ROM: full    Dental     Cardiovascular   Rhythm and rate: regular and normal      Pulmonary    breath sounds clear to auscultation                    Anesthesia Plan      History & Physical Review  History and physical reviewed and following examination; no interval change.    ASA Status:  3 .    NPO Status:  > 8 hours    Plan for General and ETT with Intravenous and Propofol induction. Maintenance will be Balanced.    PONV prophylaxis:  Ondansetron (or other 5HT-3) and Dexamethasone or Solumedrol  Additional equipment: Videolaryngoscope and Double Lumen ETT Careful positioning of R elbow, no supination.      Postoperative Care  Postoperative pain management:  IV analgesics and Oral pain medications.      Consents  Anesthetic plan, risks, benefits and alternatives discussed with:  Patient.  Use of blood products discussed: Yes.   Use of blood products discussed with Patient.  Consented to blood products.  .                          .

## 2017-08-22 NOTE — PROGRESS NOTES
Admission medication history interview status for the 8/22/2017  admission is complete. See EPIC admission navigator for prior to admission medications     Medication history source reliability:Good    Medication history interview source(s):Patient    Medication history resources (including written lists, pill bottles, clinic record):None    Primary pharmacy.[facility]    Additional medication history information not noted on PTA med list :None    Time spent in this activity: 45 minutes    Prior to Admission medications    Medication Sig Last Dose Taking? Auth Provider   LORAZEPAM PO Take 0.5 mg by mouth every 4 hours as needed for anxiety or nausea (with chemo) more than a month at prn Yes Reported, Patient   OLANZAPINE PO Take 5 mg by mouth daily more than a month at prn Yes Reported, Patient   PROCHLORPERAZINE MALEATE PO Take 10 mg by mouth every 6 hours as needed for nausea or vomiting more than a month at prn Yes Reported, Patient   order for DME Equipment being ordered: Other: ostomy supplies 1 or 2 piece barrier and pouch, deodorizing drops and spray; ostomy rings, ostomy paste  Treatment Diagnosis: Rectal cancer   Therese Amaro, MYLES

## 2017-08-23 ENCOUNTER — APPOINTMENT (OUTPATIENT)
Dept: GENERAL RADIOLOGY | Facility: CLINIC | Age: 57
DRG: 167 | End: 2017-08-23
Attending: PHYSICIAN ASSISTANT
Payer: COMMERCIAL

## 2017-08-23 VITALS
HEART RATE: 75 BPM | RESPIRATION RATE: 16 BRPM | OXYGEN SATURATION: 96 % | TEMPERATURE: 98.2 F | DIASTOLIC BLOOD PRESSURE: 70 MMHG | BODY MASS INDEX: 21.9 KG/M2 | WEIGHT: 153 LBS | HEIGHT: 70 IN | SYSTOLIC BLOOD PRESSURE: 107 MMHG

## 2017-08-23 LAB — COPATH REPORT: NORMAL

## 2017-08-23 PROCEDURE — 25000128 H RX IP 250 OP 636: Performed by: PHYSICIAN ASSISTANT

## 2017-08-23 PROCEDURE — 25000132 ZZH RX MED GY IP 250 OP 250 PS 637: Performed by: PHYSICIAN ASSISTANT

## 2017-08-23 PROCEDURE — 71010 XR CHEST PORT 1 VW: CPT

## 2017-08-23 RX ORDER — OXYCODONE HYDROCHLORIDE 5 MG/1
5-10 TABLET ORAL
Qty: 40 TABLET | Refills: 0 | Status: SHIPPED | OUTPATIENT
Start: 2017-08-23 | End: 2018-07-25

## 2017-08-23 RX ADMIN — OXYCODONE HYDROCHLORIDE 10 MG: 5 TABLET ORAL at 08:16

## 2017-08-23 RX ADMIN — RANITIDINE 150 MG: 150 TABLET ORAL at 08:16

## 2017-08-23 RX ADMIN — OXYCODONE HYDROCHLORIDE 10 MG: 5 TABLET ORAL at 03:30

## 2017-08-23 RX ADMIN — ACETAMINOPHEN 975 MG: 325 TABLET, FILM COATED ORAL at 03:30

## 2017-08-23 RX ADMIN — KETOROLAC TROMETHAMINE 30 MG: 30 INJECTION, SOLUTION INTRAMUSCULAR at 05:12

## 2017-08-23 RX ADMIN — ENOXAPARIN SODIUM 40 MG: 40 INJECTION SUBCUTANEOUS at 05:12

## 2017-08-23 RX ADMIN — SENNOSIDES AND DOCUSATE SODIUM 1 TABLET: 8.6; 5 TABLET ORAL at 08:16

## 2017-08-23 NOTE — PLAN OF CARE
Problem: Goal Outcome Summary  Goal: Goal Outcome Summary  Outcome: Improving  VSS, +bowels, -flatus, lungs clear, no crepitus, no airleak, plan to clamp CT at midnight

## 2017-08-23 NOTE — DISCHARGE INSTRUCTIONS
"[facility]  Discharge Orders & Follow-up Care  Video-Assisted: Thoracoscopy - Thoracotomy    Call Millie at Dr. Pierce  office at [phone] to make a return appointment with a chest x-ray on Wednesday 08-30  Your appointment will be with either Meme Layton PA-C or Shana Saucedo PA-C, or Dr. Pierce.      Our office is located at:  [facility], [address] [place] [address], [address], [place] [address].  Millie will explain where to park when you call for an appointment.    A. Patient Care  Call Dr Pierce  office @ [phone] if:  ? Severe chills or a fever or 100.4 F.  temperature on two occasions  ? Increased incisional pain and/or redness  ? Presence of unusual incisional or chest tube site drainage  ? Coughing up bright red blood or greenish-yellow secretions  ? Significant increase in shortness of breath    Pain Relief  You have been given a prescription for narcotic pain medicine.  You may also take ibuprofen and acetaminophen over the counter.  Recommended dosages are:  600 mg Ibuprofen every 6 hours as needed and 650 mg Acetaminophen every 4 hours as needed.  Many patients get good pain relief by \"staggering\" these medications.     No driving while on narcotics.     Activity  XXX  No activity restrictions for a scope procedure/thoracoscopy  ___ No heavy lifting greater than 8-10 pounds on the operative side for 4-6 weeks for a thoracotomy    Wound Care  Remove all dressings in 48 hours and then you may shower.  Wash the incision and chest tube site(s) daily with soap and water. No bathing or immersing incision underwater for approximately 2 weeks or until the chest tube sites are completely healed.     Place a dry gauze dressing over the chest tube site(s) because it(they) will drain a few days.  Do not be alarmed if there is drainage of a large amount of fluid (should be pink or yellow-- or somewhat bloody) either spontaneously or with coughing or exertion. If this happens, just " place a large dry gauze dressing over the chest tube site-- it will stop and scab over in about a week or so. Once the drainage stops, then leave the chest tube site(s) open to air.     White steri strips will be present on the incision(s). They will peel off within about 10 days-- otherwise, they will be removed at your post-op appointment.

## 2017-08-23 NOTE — PROGRESS NOTES
THORACIC SURGERY POD # 1    Doing well  AVSS on RA    No air leak  Minimal output    CXR:  No ptx with CT clamped    CT out    D/c today    GEO PERDOMO MD [facility] ONCOLOGY THORACIC SURGERY  CELL:  [phone]  OFFICE: [phone]

## 2017-08-23 NOTE — PLAN OF CARE
Problem: Goal Outcome Summary  Goal: Goal Outcome Summary  Outcome: Completed Date Met:  08/23/17  VSS, tolerating PO. Incision CDI. Colostomy passing flatus, no stool yet. Pt voiding. Encouraged IS and ambulation. Pain controlled with meds. Discharge instructions reviewed with pt. Prescription filled and given to pt. Pt discharged home with family.

## 2017-08-23 NOTE — ANESTHESIA POSTPROCEDURE EVALUATION
Patient: Daniele Koroma    Procedure(s):  RIGHT VIDEO ASSISTED THORACOSCOPY, RIGHT UPPER LOBE LUNG NODULE - Wound Class: I-Clean    Diagnosis:RIGHT UPPER LOBE LUNG NODUEL   Diagnosis Additional Information: No value filed.    Anesthesia Type:  General, ETT    Note:  Anesthesia Post Evaluation    Patient location during evaluation: PACU  Patient participation: Able to fully participate in evaluation  Level of consciousness: awake  Pain management: adequate  Airway patency: patent  Cardiovascular status: acceptable  Respiratory status: acceptable  Hydration status: acceptable  PONV: none     Anesthetic complications: None          Last vitals:  Vitals:    08/22/17 1615 08/22/17 1707 08/22/17 1720   BP:      Resp: 18 18 18   Temp:      SpO2:            Electronically Signed By: Daniele Hassan MD  August 22, 2017  7:12 PM

## 2017-08-23 NOTE — PLAN OF CARE
Problem: Goal Outcome Summary  Goal: Goal Outcome Summary  Outcome: No Change  VSS. Thorasic incision intact. Pain managed with scheduled tylenol and PRN oxycodone. CT clamped at midnight with 16 ml serosanguenous output. Removed this AM. Colostomy with no output. Up with SBA. Voiding adequate amounts. Pt would like discharge meds filled here.

## 2017-08-24 NOTE — DISCHARGE SUMMARY
THORACIC SURGERY HOSPITAL DISCHARGE SUMMARY  [facility] ONCOLOGY - THORACIC SURGERY  [address], [address]  [place] [address]  Phone [phone]  [email or web address]    8/24/2017     Jennifer Rodriguez   [address] [place] [address]  Phone: [phone]   Fax: [phone]        Re: Daniele Koroma             1960             [id number]              Dates of Hospitalization: 8/22/2017 - 8/23/2017   Date of Service (when I saw the patient): 8/23/17    Dear Dr. Rodriguez:     As you are aware, we had the pleasure of caring for your patient,  Daniele Koroma here at [facility].  He is a 57-year-old gentleman with a history of metastatic rectal cancer.  The most recent scan shows a new, approximately 1 cm nodule in the right upper lobe lung.  There is also a liver lesion.  This is the only site of the suspected metastatic disease.  Based on the findings, a right video-assisted thoracoscopy, possible thoracotomy and wedge resection is indicated for diagnosis and treatment.    On 8/22/2017, Dr. Fritz Pierce performed the following:    Procedure/Surgery Information   Procedure: Right video-assisted thoracoscopy, wedge resection right upper lobe lung nodule   Surgeon(s): Surgeon(s) and Role:     * Fritz Pierce MD - Primary     * Meme Layton PA-C - Assisting   Specimens:   ID Type Source Tests Collected by Time Destination   A : RIGHT UPPER LOBE LUNG NODULE  Tissue Lung, Right Upper Lobe SURGICAL PATHOLOGY EXAM Fritz Pierce MD 8/22/2017  9:34 AM         Final Surgical Pathology Revealed:  Right upper lobe lung nodule consistent with metastasis from rectal primary, negative resection margin    His post-operative course was unremarkable.      Consultations This Hospital Stay   Nonehomangelo Koroma has otherwise recovered sufficiently to be discharged to home today, 8/23/2017, on post-operative day  number one for further convalescence.  His incisions are healing well with no signs or symptoms of infection.  His bowels have moved sufficiently and he is tolerating diet and activity, ambulating and transferring independently.  He is currently afebrile with stable vital signs as below.     Below, you will find a full discharge medication list and instructions.  We have arranged for Daniele Koroma to follow-up with us in our [facility] in 7-10 days with a Chest X-ray prior to that appointment.  We thank you for allowing us to participate in the care of Daniele Koroma here at [facility].  Please feel free to contact our office at [phone] with any questions or concerns or if we can be of any further assistance in the care of this patient.    Sincerely,    Dr. Fritz Pirece MD    D/C Summary Prepared by: Meme Layton PA-C    Discharge Medications:  Discharge Medication List as of 8/23/2017  9:25 AM      START taking these medications    Details   oxyCODONE (ROXICODONE) 5 MG IR tablet Take 1-2 tablets (5-10 mg) by mouth every 3 hours as needed for moderate to severe pain, Disp-40 tablet, R-0, Local Print         CONTINUE these medications which have NOT CHANGED    Details   LORAZEPAM PO Take 0.5 mg by mouth every 4 hours as needed for anxiety or nausea (with chemo), Historical      OLANZAPINE PO Take 5 mg by mouth daily, Historical      PROCHLORPERAZINE MALEATE PO Take 10 mg by mouth every 6 hours as needed for nausea or vomiting, Historical      order for DME Equipment being ordered: Other: ostomy supplies 1 or 2 piece barrier and pouch, deodorizing drops and spray; ostomy rings, ostomy paste  Treatment Diagnosis: Rectal cancerDisp-1 Device, R-3, Local Print                 Discharge Instructions:  1) Remove chest tube dressing on 8/25/17 and then it is Ok to shower.  Please wash both incision and chest tube site daily with soap and water.  You may cover the chest tube site daily with a clean  band-aid or dry gauze if it continues to drain.  2) Steri-strips can be removed in 1 week or they will fall off when they are ready.  3) Continue daily use of your Incentive Spirometer, set of 10x in a row, every 1-2 hours while you are awake during the day.    Follow-Up Care:  1) Follow up with Meme Layton PA-C/Dr. Pierce at the [place] [facility] in [place] ([address] [place] [address], [address], [place] [address]).  Call Millie at [phone] to schedule the appointment.  2) Follow up with Primary Care Provider, Jennifer Rodriguez within 1 month of discharge for routine post-surgical care, wound check and follow up.  Please call [phone] to arrange this appointment.       CC  Patient Care Team:  Jennifer Rodriguez MD as PCP - General (Family Practice)  Jennifer Rodriguez MD as MD (Family Practice)  Courtney Day as Referring Physician (Ophthalmology)

## 2017-09-16 ENCOUNTER — OFFICE VISIT (OUTPATIENT)
Dept: FAMILY MEDICINE | Facility: CLINIC | Age: 57
End: 2017-09-16
Payer: COMMERCIAL

## 2017-09-16 VITALS
RESPIRATION RATE: 18 BRPM | DIASTOLIC BLOOD PRESSURE: 80 MMHG | WEIGHT: 152 LBS | TEMPERATURE: 97.9 F | HEIGHT: 71 IN | OXYGEN SATURATION: 96 % | HEART RATE: 91 BPM | SYSTOLIC BLOOD PRESSURE: 122 MMHG | BODY MASS INDEX: 21.28 KG/M2

## 2017-09-16 DIAGNOSIS — C34.91 PRIMARY ADENOCARCINOMA OF RIGHT LUNG (H): ICD-10-CM

## 2017-09-16 DIAGNOSIS — C20: ICD-10-CM

## 2017-09-16 DIAGNOSIS — C77.5: ICD-10-CM

## 2017-09-16 DIAGNOSIS — C78.7 SECONDARY MALIGNANT NEOPLASM OF LIVER (H): ICD-10-CM

## 2017-09-16 DIAGNOSIS — Z01.818 PREOP GENERAL PHYSICAL EXAM: Primary | ICD-10-CM

## 2017-09-16 LAB
BASOPHILS # BLD AUTO: 0 10E9/L (ref 0–0.2)
BASOPHILS NFR BLD AUTO: 0.3 %
DIFFERENTIAL METHOD BLD: ABNORMAL
EOSINOPHIL # BLD AUTO: 0.2 10E9/L (ref 0–0.7)
EOSINOPHIL NFR BLD AUTO: 6.1 %
ERYTHROCYTE [DISTWIDTH] IN BLOOD BY AUTOMATED COUNT: 13.9 % (ref 10–15)
HCT VFR BLD AUTO: 42.2 % (ref 40–53)
HGB BLD-MCNC: 14.1 G/DL (ref 13.3–17.7)
LYMPHOCYTES # BLD AUTO: 0.9 10E9/L (ref 0.8–5.3)
LYMPHOCYTES NFR BLD AUTO: 24.5 %
MCH RBC QN AUTO: 32 PG (ref 26.5–33)
MCHC RBC AUTO-ENTMCNC: 33.4 G/DL (ref 31.5–36.5)
MCV RBC AUTO: 96 FL (ref 78–100)
MONOCYTES # BLD AUTO: 0.5 10E9/L (ref 0–1.3)
MONOCYTES NFR BLD AUTO: 13.1 %
NEUTROPHILS # BLD AUTO: 2.1 10E9/L (ref 1.6–8.3)
NEUTROPHILS NFR BLD AUTO: 56 %
PLATELET # BLD AUTO: 190 10E9/L (ref 150–450)
RBC # BLD AUTO: 4.4 10E12/L (ref 4.4–5.9)
WBC # BLD AUTO: 3.8 10E9/L (ref 4–11)

## 2017-09-16 PROCEDURE — 85025 COMPLETE CBC W/AUTO DIFF WBC: CPT | Performed by: FAMILY MEDICINE

## 2017-09-16 PROCEDURE — 36415 COLL VENOUS BLD VENIPUNCTURE: CPT | Performed by: FAMILY MEDICINE

## 2017-09-16 PROCEDURE — 99214 OFFICE O/P EST MOD 30 MIN: CPT | Performed by: FAMILY MEDICINE

## 2017-09-16 NOTE — PROGRESS NOTES
[facility]  [address]  [facility] [address]  [phone]  Dept: [phone]    PRE-OP EVALUATION:  Today's date: 2017    Daniele Koroma (: 1960) presents for pre-operative evaluation assessment as requested by Dr. Osborn.  He requires evaluation and anesthesia risk assessment prior to undergoing surgery/procedure for treatment of tumor .  Proposed procedure: tumor removal from liver    Date of Surgery/ Procedure: 17  Time of Surgery/ Procedure: 6:00  Hospital/Surgical Facility: [facility]  Fax number for surgical facility:   Primary Physician: Jennifer Rodriguez  Type of Anesthesia Anticipated: General    Patient has a Health Care Directive or Living Will:  YES     1. NO - Do you have a history of heart attack, stroke, stent, bypass or surgery on an artery in the head, neck, heart or legs?  2. NO - Do you ever have any pain or discomfort in your chest?  3. NO - Do you have a history of  Heart Failure?  4. NO - Are you troubled by shortness of breath when: walking on the level, up a slight hill or at night?  5. NO - Do you currently have a cold, bronchitis or other respiratory infection?  6. NO - Do you have a cough, shortness of breath or wheezing?  7. NO - Do you sometimes get pains in the calves of your legs when you walk?  8. NO - Do you or anyone in your family have previous history of blood clots?  9. NO - Do you or does anyone in your family have a serious bleeding problem such as prolonged bleeding following surgeries or cuts?  10. NO - Have you ever had problems with anemia or been told to take iron pills?  11. NO - Have you had any abnormal blood loss such as black, tarry or bloody stools, or abnormal vaginal bleeding?  12. NO - Have you ever had a blood transfusion?  13. NO - Have you or any of your relatives ever had problems with anesthesia?  14. NO - Do you have sleep apnea, excessive snoring or daytime  drowsiness?  15. NO - Do you have any prosthetic heart valves?  16. NO - Do you have prosthetic joints?  17. NO - Is there any chance that you may be pregnant?        HPI:                                                      Brief HPI related to upcoming procedure:     Concern - LIVER NODULE     2015 ADENOCA of Rectum   7-17 Adenoca of RLL     Onset: cyst /CT in 2015 and now is solid 2cm     Description:   Above     Intensity: moderate    Progression of Symptoms:  worsening    Accompanying Signs & Symptoms:  0    Previous history of similar problem:   0    Precipitating factors:   Worsened by: 0    Alleviating factors:  Improved by: 0    Therapies Tried and outcome: 0           See problem list for active medical problems.  Problems all longstanding and stable, except as noted/documented.  See ROS for pertinent symptoms related to these conditions.                                                                                                  .    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 Provider to review and confirm.       ACP (advance care planning) 10/09/2015     Priority: Medium     Advance Care Planning 10/9/2015: ACP Review and Resources Provided:  Reviewed chart for advance care plan.  Daniele Koroma has no plan or code status on file. Discussed available resources and provided with information. Confirmed code status reflects current choices pending further ACP discussions.  Confirmed/documented  legally designated decision maker(s). 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)      Colostomy in place (H)      GERD (gastroesophageal reflux disease)      Hyperlipidemia      Malignant neoplasm of lower lobe of right lung (H)      Nodule of right lung      Noninfectious ileitis      Noninfectious ileitis      Rectal bleeding since 2009     Rectal cancer (H)      Past Surgical History:   Procedure Laterality Date     COLONOSCOPY N/A 6/3/2015    Procedure: COMBINED COLONOSCOPY, SINGLE OR MULTIPLE BIOPSY/POLYPECTOMY BY BIOPSY;  Surgeon: Sparkle Weber MD;  Location:  GI     COMBINED CYSTOSCOPY, INSERT CATHETER URETER Bilateral 10/14/2015    Procedure: COMBINED CYSTOSCOPY, INSERT CATHETER URETER;  Surgeon: Nathaniel Chang MD;  Location:  OR     DAVINCI COLECTOMY N/A 10/14/2015    Procedure: DAVINCI XI COLECTOMY;  Surgeon: Shana Alvarado MD;  Location:  OR     DAVINCI LOBECTOMY LUNG Right 7/22/2016    Procedure: DAVINCI XI LOBECTOMY LUNG;  Surgeon: Fritz Pierce MD;  Location:  OR     INCISION AND DRAINAGE RECTUM, COMBINED N/A 11/4/2015    Procedure: COMBINED INCISION AND DRAINAGE RECTUM;  Surgeon: Bradley Regalado MD;  Location:  OR     INSERT PORT VASCULAR ACCESS N/A 11/6/2015    Procedure: INSERT PORT VASCULAR ACCESS;  Surgeon: Fritz Pierce MD;  Location:  OR     INSERT PORT VASCULAR ACCESS N/A 9/12/2016    Procedure: INSERT PORT VASCULAR ACCESS;  Surgeon: Fritz Pierce MD;  Location:  SD     INSERT  PORT VASCULAR ACCESS N/A 6/9/2017    Procedure: INSERT PORT VASCULAR ACCESS;  PORT PLACEMENT WITH FLAT PLATE XRAY ;  Surgeon: Fritz Pierce MD;  Location:  SD     port removed      x2     THORACOSCOPIC WEDGE RESECTION LUNG Right 8/22/2017    Procedure: THORACOSCOPIC WEDGE RESECTION LUNG;  RIGHT VIDEO ASSISTED THORACOSCOPY, RIGHT UPPER LOBE LUNG NODULE;  Surgeon: Fritz Pierce MD;  Location:  OR     Current Outpatient Prescriptions   Medication Sig Dispense Refill     oxyCODONE (ROXICODONE) 5 MG IR tablet Take 1-2 tablets (5-10 mg) by mouth every 3 hours as needed for moderate to severe pain 40 tablet 0     LORAZEPAM PO Take 0.5 mg by mouth every 4 hours as needed for anxiety or nausea (with chemo)       OLANZAPINE PO Take 5 mg by mouth daily       PROCHLORPERAZINE MALEATE PO Take 10 mg by mouth every 6 hours as needed for nausea or vomiting       order for DME Equipment being ordered: Other: ostomy supplies 1 or 2 piece barrier and pouch, deodorizing drops and spray; ostomy rings, ostomy paste  Treatment Diagnosis: Rectal cancer 1 Device 3     OTC products: None, except as noted above    No Known Allergies   Latex Allergy: NO    Social History   Substance Use Topics     Smoking status: Never Smoker     Smokeless tobacco: Never Used     Alcohol use 0.0 oz/week     0 Standard drinks or equivalent per week      Comment: varies 0 - 10 beers     History   Drug Use     Yes     Special: Marijuana     Comment: pot before chemo       REVIEW OF SYSTEMS:                                                    C: NEGATIVE for fever, chills, change in weight  I: NEGATIVE for worrisome rashes, moles or lesions  E: NEGATIVE for vision changes or irritation  E/M: NEGATIVE for ear, mouth and throat problems  R: NEGATIVE for significant cough or SOB  B: NEGATIVE for masses, tenderness or discharge  CV: NEGATIVE for chest pain, palpitations or peripheral edema  GI: NEGATIVE for nausea, abdominal pain, heartburn,  or change in bowel habits  : NEGATIVE for frequency, dysuria, or hematuria  M: NEGATIVE for significant arthralgias or myalgia  N: NEGATIVE for weakness, dizziness or paresthesias  E: NEGATIVE for temperature intolerance, skin/hair changes  H: NEGATIVE for bleeding problems  P: NEGATIVE for changes in mood or affect    EXAM:                                                    There were no vitals taken for this visit.    GENERAL APPEARANCE: healthy, alert and no distress     EYES: EOMI,  PERRL     NECK: no adenopathy, no asymmetry, masses, or scars and thyroid normal to palpation     RESP: lungs clear to auscultation - no rales, rhonchi or wheezes     CV: regular rates and rhythm, normal S1 S2, no S3 or S4 and no murmur, click or rub     ABDOMEN:  soft, nontender, no HSM or masses and bowel sounds normal     MS: extremities normal- no gross deformities noted, no evidence of inflammation in joints, FROM in all extremities.     SKIN: no suspicious lesions or rashes     NEURO: Normal strength and tone, sensory exam grossly normal, mentation intact and speech normal     PSYCH: mentation appears normal. and affect normal/bright     LYMPHATICS: No axillary, cervical, or supraclavicular nodes    DIAGNOSTICS:                                                      Labs Resulted Today:   Results for orders placed or performed during the hospital encounter of 08/22/17   XR Chest Port 1 View    Narrative    CHEST PORTABLE ONE VIEW  8/22/2017 10:25 AM     COMPARISON: Frontal chest x-ray 6/9/2017.    HISTORY: Postop.    FINDINGS: A left subclavian central venous Port-A-Cath is again noted.  There has been interval placement of a large bore right chest tube.  There are new surgical clips in the right upper lung. The cardiac  silhouette, pulmonary vasculature, lungs and pleural spaces are within  normal limits.      Impression    IMPRESSION: Clear lungs.    ROSSY VILLALOBOS MD   XR Chest Port 1 View    Narrative    CHEST PORTABLE ONE  VIEW   8/23/2017 5:59 AM     HISTORY:  Postoperative right VATS.    COMPARISON: 8/22/2017.      Impression    IMPRESSION: Left IJ Port-A-Cath and right-sided chest tube remain in  place. No pneumothorax identified. No new focal airspace opacities.  Chain sutures in the right lung again noted. Cardiac silhouette is  unchanged.    MAGUI HOUSE MD   CBC with platelets   Result Value Ref Range    WBC 4.1 4.0 - 11.0 10e9/L    RBC Count 4.40 4.4 - 5.9 10e12/L    Hemoglobin 14.0 13.3 - 17.7 g/dL    Hematocrit 40.8 40.0 - 53.0 %    MCV 93 78 - 100 fl    MCH 31.8 26.5 - 33.0 pg    MCHC 34.3 31.5 - 36.5 g/dL    RDW 15.2 (H) 10.0 - 15.0 %    Platelet Count 165 150 - 450 10e9/L   Basic metabolic panel   Result Value Ref Range    Sodium 141 133 - 144 mmol/L    Potassium 3.7 3.4 - 5.3 mmol/L    Chloride 106 94 - 109 mmol/L    Carbon Dioxide 28 20 - 32 mmol/L    Anion Gap 7 3 - 14 mmol/L    Glucose 97 70 - 99 mg/dL    Urea Nitrogen 13 7 - 30 mg/dL    Creatinine 0.82 0.66 - 1.25 mg/dL    GFR Estimate >90 >60 mL/min/1.7m2    GFR Estimate If Black >90 >60 mL/min/1.7m2    Calcium 8.5 8.5 - 10.1 mg/dL   INR   Result Value Ref Range    INR 0.96 0.86 - 1.14   Surgical pathology exam   Result Value Ref Range    Copath Report       Patient Name: YAZAN PINEDA  MR#: [id number]  Specimen #: [id number]  Collected: 8/22/2017  Received: 8/22/2017  Reported: 8/23/2017 11:27  Ordering Phy(s): GEO PERDOMO    For improved result formatting, select 'View Enhanced Report Format'  under Linked Documents section.    SPECIMEN(S):  Right upper lobe lung nodule    FINAL DIAGNOSIS:  Lung, right upper lobe, wedge resection  - Adenocarcinoma consistent with metastasis from rectal primary  - Resection margin negative for malignancy  - See comment    COMMENT:  Molecular testing was performed on a previous pulmonary metastasis from  this patient in 2016. This testing will not be repeated at this time but  repeat testing can be performed if  "requested.    Electronically signed out by:    Mario Alberto Pablo M.D.    CLINICAL HISTORY:  Right upper lobe lung nodules    GROSS:  The specimen is received fresh with the patient's name and proper  identification a labeled \"right upper lobe lung nodule\".  The specimen  consists of 13 g pink- red spongy lung wedge resection(9.8 x 3.5 x 2.0  cm) with stapled surgical resection margin measuring 10.6 cm in length.  Along the periphery there is a subpleural tan rubbery nodule (1.2 x 1.0  x 0.7 cm) causing the pleura to pucker.  The pleura is inked black.  Grossly the lesion is 0.7 cm from the stapled surgical resection margin  a representative section of the nodule is submitted in one check for  frozen section.  The remaining lung parenchyma is red and spongy  throughout.  Representative sections are submitted.    Cassette 1-section  Cassette 2-remaining nodule  Cassette 3-resection margin  Cassette 4-uninvolved parenchyma (Dictated by: Gerson Hanson 8/22/2017  10:09 AM)    INTRAOPERATIVE CONSULTATION:    Frozen section diagnosis: Consistent with metastatic adenocarcinoma, Dr. Pablo    MICROSCOPIC:  A formal microscopic examination was performed.    CPT Codes:  A: 30242-BL2, 90491-LU    TESTING LAB LOCATION:  [address]   [address] [phone]    COLLECTION SITE:  Client: [facility]  Location: [facility] (S)     ABO/Rh type and screen   Result Value Ref Range    ABO O     RH(D) Neg     Antibody Screen Neg     Test Valid Only At [facility]        Specimen Expires 08/25/2017        Recent Labs   Lab Test  08/22/17   0820  05/26/17   0654 08/10/16   07/22/16   1235   HGB  14.0  14.8   --    < >  14.3   PLT  165  171   --    < >  142*   INR  0.96  1.00   --    --   1.04   NA  141   --    --    --   139   POTASSIUM  3.7   --   4.0   --   4.8   CR  0.82   --   0.73   --   0.84    < > = values in this interval not displayed.    "     IMPRESSION:                                                    Reason for surgery/procedure: liver nodule   Diagnosis/reason for consult: medical review       The proposed surgical procedure is considered INTERMEDIATE risk.    REVISED CARDIAC RISK INDEX  The patient has the following serious cardiovascular risks for perioperative complications such as (MI, PE, VFib and 3  AV Block):  No serious cardiac risks  INTERPRETATION: 0 risks: Class I (very low risk - 0.4% complication rate)    The patient has the following additional risks for perioperative complications:  No identified additional risks    No diagnosis found.    RECOMMENDATIONS:                                                          --Patient is to take all scheduled medications on the day of surgery EXCEPT for modifications listed below.    APPROVAL GIVEN to proceed with proposed procedure, without further diagnostic evaluation       Signed Electronically by: Jennifer Rodriguez MD    Copy of this evaluation report is provided to requesting physician.    [facility] Preop Guidelines

## 2017-09-16 NOTE — MR AVS SNAPSHOT
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 This includes signs of a cold or flu (such as a sore throat, runny nose, cough, rash or fever).    Do not smoke, drink alcohol or take over the counter medicine (unless your surgeon or primary care doctor tells you to) for the 24 hours before and after surgery.    If you take prescribed drugs: Follow your doctor s orders about which medicines to take and which to stop until after surgery.    Eating and drinking prior to surgery: follow the instructions from your surgeon    Take a shower or bath the night before surgery. Use the soap your surgeon gave you to gently clean your skin. If you do not have soap from your surgeon, use your regular soap. Do not shave or scrub the surgery site.  Wear clean pajamas and have clean sheets on your bed.     1. Please stop fish oil,  aspirin, any NSAIDs ( incuding aleve advil, ibuprofen, etc--use tylenol= acetaminophen instead)  vitamin D, and multivitamins for 7 days prior to and 7 days after surgery               Follow-ups after your visit        Who to contact     If you have questions or need follow up information about today's clinic visit or your schedule please contact [facility] directly at [phone].  Normal or non-critical lab and imaging results will be communicated to you by MyChart, letter or phone within 4 business days after the clinic has received  "the results. If you do not hear from us within 7 days, please contact the clinic through [employer] or phone. If you have a critical or abnormal lab result, we will notify you by phone as soon as possible.  Submit refill requests through [employer] or call your pharmacy and they will forward the refill request to us. Please allow 3 business days for your refill to be completed.          Additional Information About Your Visit        [employer] Information     [employer] lets you send messages to your doctor, view your test results, renew your prescriptions, schedule appointments and more. To sign up, go to [email or web address] . Click on \"Log in\" on the left side of the screen, which will take you to the Welcome page. Then click on \"Sign up Now\" on the right side of the page.     You will be asked to enter the access code listed below, as well as some personal information. Please follow the directions to create your username and password.     Your access code is: [email or web address]  Expires: 12/15/2017 10:13 AM     Your access code will  in 90 days. If you need help or a new code, please call your [facility] clinic or [phone].        Care EveryWhere ID     This is your Care EveryWhere ID. This could be used by other organizations to access your [facility] medical records  [id number]        Your Vitals Were     Pulse Temperature Respirations Height Pulse Oximetry BMI (Body Mass Index)    91 97.9  F (36.6  C) (Tympanic) 18 5' 10.5\" (1.791 m) 96% 21.5 kg/m2       Blood Pressure from Last 3 Encounters:   17 122/80   17 107/70   17 126/78    Weight from Last 3 Encounters:   17 152 lb (68.9 kg)   17 153 lb (69.4 kg)   17 155 lb (70.3 kg)              Today, you had the following     No orders found for display       Primary Care Provider Office Phone # Fax #    Jennifer Rodriguez [phone]       [address] [facility] [address]        Equal Access to " Services     [facility]: Hadii omega fu harmeetjeff Jose Manuelali, waaxda luqadaha, qaybta kaalmada oumou, eileen forrest . So [facility] [phone].    ATENCIÓN: Si habla sylvester, tiene a quiles disposición servicios gratuitos de asistencia lingüística. Llame al [phone].    We comply with applicable federal civil rights laws and [place] laws. We do not discriminate on the basis of race, color, national origin, age, disability sex, sexual orientation or gender identity.            Thank you!     Thank you for choosing [facility]  for your care. Our goal is always to provide you with excellent care. Hearing back from our patients is one way we can continue to improve our services. Please take a few minutes to complete the written survey that you may receive in the mail after your visit with us. Thank you!             Your Updated Medication List - Protect others around you: Learn how to safely use, store and throw away your medicines at www.disposemymeds.org.          This list is accurate as of: 9/16/17 10:14 AM.  Always use your most recent med list.                   Brand Name Dispense Instructions for use Diagnosis    LORAZEPAM PO      Take 0.5 mg by mouth every 4 hours as needed for anxiety or nausea (with chemo)        OLANZAPINE PO      Take 5 mg by mouth daily        order for DME     1 Device    Equipment being ordered: Other: ostomy supplies 1 or 2 piece barrier and pouch, deodorizing drops and spray; ostomy rings, ostomy paste Treatment Diagnosis: Rectal cancer    Rectal cancer metastasised to intrapelvic lymph node, Colostomy in place (H)       oxyCODONE 5 MG IR tablet    ROXICODONE    40 tablet    Take 1-2 tablets (5-10 mg) by mouth every 3 hours as needed for moderate to severe pain    Nodule of right lung       PROCHLORPERAZINE MALEATE PO      Take 10 mg by mouth every 6 hours as needed for nausea or vomiting

## 2017-09-16 NOTE — NURSING NOTE
"Chief Complaint   Patient presents with     Pre-Op Exam       Initial /80 (BP Location: Left arm, Patient Position: Chair, Cuff Size: Adult Regular)  Pulse 91  Temp 97.9  F (36.6  C) (Tympanic)  Resp 18  Ht 5' 10.5\" (1.791 m)  Wt 152 lb (68.9 kg)  SpO2 96%  BMI 21.5 kg/m2 Estimated body mass index is 21.5 kg/(m^2) as calculated from the following:    Height as of this encounter: 5' 10.5\" (1.791 m).    Weight as of this encounter: 152 lb (68.9 kg).  Medication Reconciliation: complete    "

## 2017-09-16 NOTE — PATIENT INSTRUCTIONS
Before Your Surgery      Call your surgeon if there is any change in your health. This includes signs of a cold or flu (such as a sore throat, runny nose, cough, rash or fever).    Do not smoke, drink alcohol or take over the counter medicine (unless your surgeon or primary care doctor tells you to) for the 24 hours before and after surgery.    If you take prescribed drugs: Follow your doctor s orders about which medicines to take and which to stop until after surgery.    Eating and drinking prior to surgery: follow the instructions from your surgeon    Take a shower or bath the night before surgery. Use the soap your surgeon gave you to gently clean your skin. If you do not have soap from your surgeon, use your regular soap. Do not shave or scrub the surgery site.  Wear clean pajamas and have clean sheets on your bed.     1. Please stop fish oil,  aspirin, any NSAIDs ( incuding aleve advil, ibuprofen, etc--use tylenol= acetaminophen instead)  vitamin D, and multivitamins for 7 days prior to and 7 days after surgery

## 2017-09-18 ENCOUNTER — TELEPHONE (OUTPATIENT)
Dept: FAMILY MEDICINE | Facility: CLINIC | Age: 57
End: 2017-09-18

## 2017-09-18 NOTE — TELEPHONE ENCOUNTER
Soham from [facility] called requesting pre-op paperwork faxed along with recent labs and EKG to [phone].   Huddled with team 2 coordinator who is currently faxing this information.

## 2017-09-20 ENCOUNTER — TRANSFERRED RECORDS (OUTPATIENT)
Dept: HEALTH INFORMATION MANAGEMENT | Facility: CLINIC | Age: 57
End: 2017-09-20

## 2018-02-07 ENCOUNTER — HOSPITAL ENCOUNTER (OUTPATIENT)
Facility: CLINIC | Age: 58
End: 2018-02-07
Admitting: RADIOLOGY
Payer: COMMERCIAL

## 2018-02-07 ENCOUNTER — TRANSFERRED RECORDS (OUTPATIENT)
Dept: HEALTH INFORMATION MANAGEMENT | Facility: CLINIC | Age: 58
End: 2018-02-07

## 2018-02-20 ENCOUNTER — APPOINTMENT (OUTPATIENT)
Dept: INTERVENTIONAL RADIOLOGY/VASCULAR | Facility: CLINIC | Age: 58
End: 2018-02-20
Attending: INTERNAL MEDICINE
Payer: COMMERCIAL

## 2018-02-20 ENCOUNTER — HOSPITAL ENCOUNTER (OUTPATIENT)
Facility: CLINIC | Age: 58
Discharge: HOME | End: 2018-02-20
Attending: RADIOLOGY | Admitting: RADIOLOGY
Payer: COMMERCIAL

## 2018-02-20 VITALS
SYSTOLIC BLOOD PRESSURE: 127 MMHG | DIASTOLIC BLOOD PRESSURE: 96 MMHG | TEMPERATURE: 95.8 F | RESPIRATION RATE: 16 BRPM | HEART RATE: 72 BPM | OXYGEN SATURATION: 99 %

## 2018-02-20 DIAGNOSIS — Z51.89 TREATMENT: ICD-10-CM

## 2018-02-20 LAB
ERYTHROCYTE [DISTWIDTH] IN BLOOD BY AUTOMATED COUNT: 14.1 % (ref 10–15)
HCT VFR BLD AUTO: 38.8 % (ref 40–53)
HGB BLD-MCNC: 13.2 G/DL (ref 13.3–17.7)
INR PPP: 1.04 (ref 0.86–1.14)
MCH RBC QN AUTO: 31.5 PG (ref 26.5–33)
MCHC RBC AUTO-ENTMCNC: 34 G/DL (ref 31.5–36.5)
MCV RBC AUTO: 93 FL (ref 78–100)
PLATELET # BLD AUTO: 165 10E9/L (ref 150–450)
RBC # BLD AUTO: 4.19 10E12/L (ref 4.4–5.9)
WBC # BLD AUTO: 3.6 10E9/L (ref 4–11)

## 2018-02-20 PROCEDURE — 85027 COMPLETE CBC AUTOMATED: CPT | Performed by: NURSE PRACTITIONER

## 2018-02-20 PROCEDURE — 36582 REPLACE TUNNELED CV CATH: CPT

## 2018-02-20 PROCEDURE — 40000854 ZZH STATISTIC SIMPLE TUBE INSERTION/CHARGE, PORT, CATH, FISTULOGRAM

## 2018-02-20 PROCEDURE — 76937 US GUIDE VASCULAR ACCESS: CPT

## 2018-02-20 PROCEDURE — 85610 PROTHROMBIN TIME: CPT | Performed by: NURSE PRACTITIONER

## 2018-02-20 PROCEDURE — 27210886 ZZH ACCESSORY CR5

## 2018-02-20 PROCEDURE — 25000128 H RX IP 250 OP 636: Performed by: RADIOLOGY

## 2018-02-20 PROCEDURE — C1769 GUIDE WIRE: HCPCS

## 2018-02-20 PROCEDURE — 25000125 ZZHC RX 250: Performed by: RADIOLOGY

## 2018-02-20 PROCEDURE — 77001 FLUOROGUIDE FOR VEIN DEVICE: CPT | Mod: XE

## 2018-02-20 PROCEDURE — 27210742 ZZH CATH CR1

## 2018-02-20 PROCEDURE — 99152 MOD SED SAME PHYS/QHP 5/>YRS: CPT

## 2018-02-20 PROCEDURE — 25000128 H RX IP 250 OP 636: Performed by: NURSE PRACTITIONER

## 2018-02-20 PROCEDURE — 27211193 ZZ H WOUND GLUE CR1

## 2018-02-20 PROCEDURE — C1788 PORT, INDWELLING, IMP: HCPCS

## 2018-02-20 PROCEDURE — 36598 INJ W/FLUOR EVAL CV DEVICE: CPT | Mod: LT

## 2018-02-20 PROCEDURE — 99153 MOD SED SAME PHYS/QHP EA: CPT

## 2018-02-20 RX ORDER — FENTANYL CITRATE 50 UG/ML
INJECTION, SOLUTION INTRAMUSCULAR; INTRAVENOUS
Status: DISCONTINUED
Start: 2018-02-20 | End: 2018-02-20 | Stop reason: HOSPADM

## 2018-02-20 RX ORDER — CEFAZOLIN SODIUM 2 G/100ML
2 INJECTION, SOLUTION INTRAVENOUS
Status: DISCONTINUED | OUTPATIENT
Start: 2018-02-20 | End: 2018-02-20 | Stop reason: HOSPADM

## 2018-02-20 RX ORDER — HEPARIN SODIUM (PORCINE) LOCK FLUSH IV SOLN 100 UNIT/ML 100 UNIT/ML
SOLUTION INTRAVENOUS
Status: DISCONTINUED
Start: 2018-02-20 | End: 2018-02-20 | Stop reason: HOSPADM

## 2018-02-20 RX ORDER — LIDOCAINE HYDROCHLORIDE 10 MG/ML
INJECTION, SOLUTION INFILTRATION; PERINEURAL
Status: DISCONTINUED
Start: 2018-02-20 | End: 2018-02-20 | Stop reason: HOSPADM

## 2018-02-20 RX ORDER — HEPARIN SODIUM 1000 [USP'U]/ML
500 INJECTION, SOLUTION INTRAVENOUS; SUBCUTANEOUS
Status: DISCONTINUED | OUTPATIENT
Start: 2018-02-20 | End: 2018-02-20 | Stop reason: HOSPADM

## 2018-02-20 RX ORDER — LIDOCAINE HYDROCHLORIDE 10 MG/ML
1-30 INJECTION, SOLUTION INFILTRATION; PERINEURAL
Status: COMPLETED | OUTPATIENT
Start: 2018-02-20 | End: 2018-02-20

## 2018-02-20 RX ORDER — NALOXONE HYDROCHLORIDE 0.4 MG/ML
.1-.4 INJECTION, SOLUTION INTRAMUSCULAR; INTRAVENOUS; SUBCUTANEOUS
Status: DISCONTINUED | OUTPATIENT
Start: 2018-02-20 | End: 2018-02-20 | Stop reason: HOSPADM

## 2018-02-20 RX ORDER — ACETAMINOPHEN 325 MG/1
650-975 TABLET ORAL
Status: DISCONTINUED | OUTPATIENT
Start: 2018-02-20 | End: 2018-02-20 | Stop reason: HOSPADM

## 2018-02-20 RX ORDER — CEFAZOLIN SODIUM 2 G/100ML
2 INJECTION, SOLUTION INTRAVENOUS
Status: COMPLETED | OUTPATIENT
Start: 2018-02-20 | End: 2018-02-20

## 2018-02-20 RX ORDER — LIDOCAINE 40 MG/G
CREAM TOPICAL
Status: DISCONTINUED | OUTPATIENT
Start: 2018-02-20 | End: 2018-02-20 | Stop reason: HOSPADM

## 2018-02-20 RX ORDER — HEPARIN SODIUM (PORCINE) LOCK FLUSH IV SOLN 100 UNIT/ML 100 UNIT/ML
5 SOLUTION INTRAVENOUS
Status: DISCONTINUED | OUTPATIENT
Start: 2018-02-20 | End: 2018-02-20 | Stop reason: HOSPADM

## 2018-02-20 RX ORDER — IOPAMIDOL 612 MG/ML
50 INJECTION, SOLUTION INTRAVASCULAR ONCE
Status: COMPLETED | OUTPATIENT
Start: 2018-02-20 | End: 2018-02-20

## 2018-02-20 RX ORDER — HEPARIN SODIUM,PORCINE 10 UNIT/ML
5 VIAL (ML) INTRAVENOUS EVERY 24 HOURS
Status: DISCONTINUED | OUTPATIENT
Start: 2018-02-20 | End: 2018-02-20 | Stop reason: HOSPADM

## 2018-02-20 RX ORDER — HEPARIN SODIUM (PORCINE) LOCK FLUSH IV SOLN 100 UNIT/ML 100 UNIT/ML
500 SOLUTION INTRAVENOUS EVERY 8 HOURS
Status: DISCONTINUED | OUTPATIENT
Start: 2018-02-20 | End: 2018-02-20 | Stop reason: HOSPADM

## 2018-02-20 RX ORDER — FLUMAZENIL 0.1 MG/ML
0.2 INJECTION, SOLUTION INTRAVENOUS
Status: DISCONTINUED | OUTPATIENT
Start: 2018-02-20 | End: 2018-02-20 | Stop reason: HOSPADM

## 2018-02-20 RX ORDER — HEPARIN SODIUM 1000 [USP'U]/ML
INJECTION, SOLUTION INTRAVENOUS; SUBCUTANEOUS
Status: DISCONTINUED
Start: 2018-02-20 | End: 2018-02-20 | Stop reason: HOSPADM

## 2018-02-20 RX ORDER — LIDOCAINE HYDROCHLORIDE 10 MG/ML
1-30 INJECTION, SOLUTION EPIDURAL; INFILTRATION; INTRACAUDAL; PERINEURAL
Status: COMPLETED | OUTPATIENT
Start: 2018-02-20 | End: 2018-02-20

## 2018-02-20 RX ORDER — FENTANYL CITRATE 50 UG/ML
25-50 INJECTION, SOLUTION INTRAMUSCULAR; INTRAVENOUS EVERY 5 MIN PRN
Status: DISCONTINUED | OUTPATIENT
Start: 2018-02-20 | End: 2018-02-20 | Stop reason: HOSPADM

## 2018-02-20 RX ADMIN — FENTANYL CITRATE 50 MCG: 50 INJECTION, SOLUTION INTRAMUSCULAR; INTRAVENOUS at 15:18

## 2018-02-20 RX ADMIN — FENTANYL CITRATE 50 MCG: 50 INJECTION, SOLUTION INTRAMUSCULAR; INTRAVENOUS at 15:56

## 2018-02-20 RX ADMIN — CEFAZOLIN SODIUM 2 G: 2 INJECTION, SOLUTION INTRAVENOUS at 14:52

## 2018-02-20 RX ADMIN — LIDOCAINE HYDROCHLORIDE 15 ML: 10 INJECTION, SOLUTION INFILTRATION; PERINEURAL at 15:40

## 2018-02-20 RX ADMIN — FENTANYL CITRATE 50 MCG: 50 INJECTION, SOLUTION INTRAMUSCULAR; INTRAVENOUS at 15:47

## 2018-02-20 RX ADMIN — IOPAMIDOL 5 ML: 612 INJECTION, SOLUTION INTRAVENOUS at 14:05

## 2018-02-20 RX ADMIN — SODIUM CHLORIDE, PRESERVATIVE FREE 500 UNITS: 5 INJECTION INTRAVENOUS at 16:09

## 2018-02-20 RX ADMIN — MIDAZOLAM HYDROCHLORIDE 1 MG: 1 INJECTION, SOLUTION INTRAMUSCULAR; INTRAVENOUS at 15:47

## 2018-02-20 RX ADMIN — MIDAZOLAM HYDROCHLORIDE 1 MG: 1 INJECTION, SOLUTION INTRAMUSCULAR; INTRAVENOUS at 15:18

## 2018-02-20 RX ADMIN — MIDAZOLAM HYDROCHLORIDE 1 MG: 1 INJECTION, SOLUTION INTRAMUSCULAR; INTRAVENOUS at 15:35

## 2018-02-20 RX ADMIN — FENTANYL CITRATE 50 MCG: 50 INJECTION, SOLUTION INTRAMUSCULAR; INTRAVENOUS at 15:35

## 2018-02-20 RX ADMIN — LIDOCAINE HYDROCHLORIDE 15 ML: 10 INJECTION, SOLUTION INFILTRATION; PERINEURAL at 16:00

## 2018-02-20 NOTE — PROGRESS NOTES
Reported to this writer that current port is cracked. Will proceed to place new port. Port deaccessed. periperal IV placed and labs sent.

## 2018-02-20 NOTE — PROCEDURES
RADIOLOGY PROCEDURE NOTE  Patient name: Daniele Koroma  MRN: [id number]  : 1960    Pre-procedure diagnosis: fractured port  Post-procedure diagnosis: Same    Procedure Date/Time: 2018  4:15 PM  Procedure: left subclavian port removal, placement of a left IJ power port.  Estimated blood loss: None  Specimen(s) collected with description: left IJ port.  The patient tolerated the procedure well with no immediate complications.  Significant findings:none    See imaging dictation for procedural details.    Provider name: Sammie Givens  Assistant(s):None

## 2018-02-20 NOTE — PROGRESS NOTES
Pt here for port dye study. States pain in left shoulder and worse after chemo. Port accessed. States understanding of procedure.

## 2018-02-20 NOTE — PROGRESS NOTES
Interventional Radiology Intra-procedural Nursing Note    Patient Name: Daniele Koroma  Medical Record Number: [id number]  Today's Date: February 20, 2018    Start Time: 1400  End of procedure time: 1404  Procedure: Port Dye Study  Report given to: [facility]  Time pt departs:  1407  :     Other Notes: 1402   Contrast injected PMD paged.1404 Pt to [facility] while waiting for plan marie Feliciano

## 2018-02-20 NOTE — DISCHARGE INSTRUCTIONS
Port Insertion Discharge Instructions     After you go home:      Have an adult stay with you for the first 6 hours    You may resume your normal diet       For 24 hours - due to the sedation you received:    Relax and take it easy    Do NOT make any important or legal decisions    Do NOT drive or operate machines at home or at work    Do NOT drink alcohol    Care of Puncture Sites:      Keep the dressings on your sites clean & dry for 3 days. Change it only if it gets wet or dirty.    You may shower after the dressing comes off in 3 days    Do not remove the small white strips of tape, if present. Allow them to fall off on their own.     You may cover the wound with a bandaid after the dressing is removed if needed for comfort      Activity       Avoid heavy lifting (greater than 10 pounds) or the overuse of your shoulder for 3 days    Bleeding:      If you start bleeding from the incision sites in your chest or neck - or have swelling in your neck, sit down and press on the site for 5-10 minutes.     If bleeding has not stopped after 10 minutes, call your provider.        Call 911 right away if you have heavy bleeding or bleeding that does not stop.      Medicines:      You may resume all medications    Resume your Warfarin/Coumadin at your regular dose today. Follow up with your provider to have your INR rechecked    Resume your Platelet Inhibitors and Aspirin tomorrow at your regular dose    For minor pain, you may take Acetaminophen (Tylenol) or Ibuprofen (Advil)    Call the provider who ordered this procedure if:      You have swelling in your neck or over your port site    The incision area is red, swollen, hot or tender    You have chills or a fever greater than 101 F (38 C)    Any questions or concerns    Call  911 or go to the Emergency Room if you have:      Severe chest pain or trouble breathing    Bleeding that you cannot control    Additional Information:      Your port may be accessed right away.      You will need to have your port flushed every 30 days or after each use.      If you have questions call:          [facility] Radiology Dept @ [phone]      The provider who performed your procedure was Dr. Givens.

## 2018-02-20 NOTE — IP AVS SNAPSHOT
[place] [address]    [place] [address]    Phone:  [phone]                                       After Visit Summary   2/20/2018    Daniele Koroma    MRN: [id number]           After Visit Summary Signature Page     I have received my discharge instructions, and my questions have been answered. I have discussed any challenges I see with this plan with the nurse or doctor.    ..........................................................................................................................................  Patient/Patient Representative Signature      ..........................................................................................................................................  Patient Representative Print Name and Relationship to Patient    ..................................................               ................................................  Date                                            Time    ..........................................................................................................................................  Reviewed by Signature/Title    ...................................................              ..............................................  Date                                                            Time

## 2018-02-20 NOTE — PROGRESS NOTES
Interventional Radiology Intra-procedural Nursing Note    Patient Name: Daniele Koroma  Medical Record Number: [id number]  Today's Date: February 20, 2018    Start Time: 1532  End of procedure time: 1612  Procedure: Port removal / port placement  Report given to: [facility] RN  Time pt departs:  1625  :     Other Notes: Pt prepped draped Pt requesting lots of Fentanyl.  1532  Chon to begin.1612 Bard Power port placed lot # [id number]  1625 Pt to [facility] per Kim salazar RN for further monitoring.  Virginie Feliciano

## 2018-02-20 NOTE — PROGRESS NOTES
Patient s/p port a cath removal and new kamlesh cath placement in left chest.  Denies pain, VS WNL, able to ambulate and void.  Patient states he will not follow instructions and plans to drink beer tonight after instructed not to have alcohol tonight.  States he has no other questions about his DC home instructions.  DC home with .

## 2018-02-20 NOTE — IP AVS SNAPSHOT
MRN:[id number]                      After Visit Summary   2/20/2018    Daniele Koroma    MRN: [id number]           Visit Information        Department      2/20/2018 12:53 PM [facility]          Review of your medicines      UNREVIEWED medicines. Ask your doctor about these medicines        Dose / Directions    IBUPROFEN PO        Dose:  600 mg   Take 600 mg by mouth every 4 hours as needed for moderate pain   Refills:  0       LORAZEPAM PO        Dose:  0.5 mg   Take 0.5 mg by mouth every 4 hours as needed for anxiety or nausea (with chemo)   Refills:  0       OLANZAPINE PO        Dose:  5 mg   Take 5 mg by mouth daily   Refills:  0       oxyCODONE IR 5 MG tablet   Commonly known as:  ROXICODONE   Used for:  Nodule of right lung        Dose:  5-10 mg   Take 1-2 tablets (5-10 mg) by mouth every 3 hours as needed for moderate to severe pain   Quantity:  40 tablet   Refills:  0       PROCHLORPERAZINE MALEATE PO        Dose:  10 mg   Take 10 mg by mouth every 6 hours as needed for nausea or vomiting   Refills:  0         CONTINUE these medicines which have NOT CHANGED        Dose / Directions    order for DME   Used for:  Rectal cancer metastasised to intrapelvic lymph node, Colostomy in place (H)        Equipment being ordered: Other: ostomy supplies 1 or 2 piece barrier and pouch, deodorizing drops and spray; ostomy rings, ostomy paste Treatment Diagnosis: Rectal cancer   Quantity:  1 Device   Refills:  3                Protect others around you: Learn how to safely use, store and throw away your medicines at www.disposemymeds.org.         Follow-ups after your visit         Care Instructions        Further instructions from your care team         Port Insertion Discharge Instructions     After you go home:      Have an adult stay with you for the first 6 hours    You may resume your normal diet       For 24 hours - due to the sedation you received:    Relax and take it easy    Do  NOT make any important or legal decisions    Do NOT drive or operate machines at home or at work    Do NOT drink alcohol    Care of Puncture Sites:      Keep the dressings on your sites clean & dry for 3 days. Change it only if it gets wet or dirty.    You may shower after the dressing comes off in 3 days    Do not remove the small white strips of tape, if present. Allow them to fall off on their own.     You may cover the wound with a bandaid after the dressing is removed if needed for comfort      Activity       Avoid heavy lifting (greater than 10 pounds) or the overuse of your shoulder for 3 days    Bleeding:      If you start bleeding from the incision sites in your chest or neck - or have swelling in your neck, sit down and press on the site for 5-10 minutes.     If bleeding has not stopped after 10 minutes, call your provider.        Call 911 right away if you have heavy bleeding or bleeding that does not stop.      Medicines:      You may resume all medications    Resume your Warfarin/Coumadin at your regular dose today. Follow up with your provider to have your INR rechecked    Resume your Platelet Inhibitors and Aspirin tomorrow at your regular dose    For minor pain, you may take Acetaminophen (Tylenol) or Ibuprofen (Advil)    Call the provider who ordered this procedure if:      You have swelling in your neck or over your port site    The incision area is red, swollen, hot or tender    You have chills or a fever greater than 101 F (38 C)    Any questions or concerns    Call  911 or go to the Emergency Room if you have:      Severe chest pain or trouble breathing    Bleeding that you cannot control    Additional Information:      Your port may be accessed right away.     You will need to have your port flushed every 30 days or after each use.      If you have questions call:          [facility] Radiology Dept @ [phone]      The provider who performed your procedure was Dr. Givens.        "Additional Information About Your Visit        MyChart Information     [employer] lets you send messages to your doctor, view your test results, renew your prescriptions, schedule appointments and more. To sign up, go to [email or web address] . Click on \"Log in\" on the left side of the screen, which will take you to the Welcome page. Then click on \"Sign up Now\" on the right side of the page.     You will be asked to enter the access code listed below, as well as some personal information. Please follow the directions to create your username and password.     Your access code is: [email or web address]  Expires: 2018  4:32 PM     Your access code will  in 90 days. If you need help or a new code, please call your [facility] clinic or [phone].        Care EveryWhere ID     This is your Care EveryWhere ID. This could be used by other organizations to access your [facility] medical records  [id number]        Your Vitals Were     Blood Pressure Pulse Temperature Respirations Pulse Oximetry       122/96 72 95.8  F (35.4  C) (Oral) 13 99%        Primary Care Provider Office Phone # Fax #    Jennifer Doris Rodriguez [phone]      Equal Access to Services     [facility] : Hadii omega ku hadasho Soomaali, waaxda luqadaha, qaybta kaalmada adeegyada, eileen forrest . So [facility] [phone].    ATENCIÓN: Si habla español, tiene a quiles disposición servicios gratuitos de asistencia lingüística. Llame al [phone].    We comply with applicable federal civil rights laws and [place] laws. We do not discriminate on the basis of race, color, national origin, age, disability, sex, sexual orientation, or gender identity.            Thank you!     Thank you for choosing [facility] for your care. Our goal is always to provide you with excellent care. Hearing back from our patients is one way we can continue to improve our services. Please take a few minutes to complete the written survey " that you may receive in the mail after you visit with us. Thank you!             Medication List: This is a list of all your medications and when to take them. Check marks below indicate your daily home schedule. Keep this list as a reference.      Medications           Morning Afternoon Evening Bedtime As Needed    IBUPROFEN PO   Take 600 mg by mouth every 4 hours as needed for moderate pain                                LORAZEPAM PO   Take 0.5 mg by mouth every 4 hours as needed for anxiety or nausea (with chemo)                                OLANZAPINE PO   Take 5 mg by mouth daily                                order for DME   Equipment being ordered: Other: ostomy supplies 1 or 2 piece barrier and pouch, deodorizing drops and spray; ostomy rings, ostomy paste Treatment Diagnosis: Rectal cancer                                oxyCODONE IR 5 MG tablet   Commonly known as:  ROXICODONE   Take 1-2 tablets (5-10 mg) by mouth every 3 hours as needed for moderate to severe pain                                PROCHLORPERAZINE MALEATE PO   Take 10 mg by mouth every 6 hours as needed for nausea or vomiting

## 2018-05-03 NOTE — PROCEDURES
I returned the pt's phone call and LM to call us back today [phone] to reschedule her appointment.    RADIOLOGY PROCEDURE NOTE  Patient name: Daniele Koroma  MRN: [id number]  : 1960    Pre-procedure diagnosis: port check showed extravasation  Post-procedure diagnosis: Same    Procedure Date/Time: May 26, 2017  8:42 AM  Procedure: port removal  Estimated blood loss: None  Specimen(s) collected with description: none  The patient tolerated the procedure well with no immediate complications.  Significant findings:none    See imaging dictation for procedural details.    Provider name: Sammie Givens  Assistant(s):None

## 2018-06-12 ENCOUNTER — HOME INFUSION (PRE-WILLOW HOME INFUSION) (OUTPATIENT)
Dept: PHARMACY | Facility: CLINIC | Age: 58
End: 2018-06-12

## 2018-06-13 NOTE — PROGRESS NOTES
This is a recent snapshot of the patient's [facility] medical record.  For current drug dose and complete information and questions, call [phone] or In [facility] pool, fv home infusion ([id number])  CSN Number:  [id number]

## 2018-06-26 ENCOUNTER — HOME INFUSION (PRE-WILLOW HOME INFUSION) (OUTPATIENT)
Dept: PHARMACY | Facility: CLINIC | Age: 58
End: 2018-06-26

## 2018-07-25 ENCOUNTER — OFFICE VISIT (OUTPATIENT)
Dept: FAMILY MEDICINE | Facility: CLINIC | Age: 58
End: 2018-07-25
Payer: COMMERCIAL

## 2018-07-25 VITALS
OXYGEN SATURATION: 96 % | SYSTOLIC BLOOD PRESSURE: 122 MMHG | TEMPERATURE: 97 F | RESPIRATION RATE: 14 BRPM | HEIGHT: 70 IN | BODY MASS INDEX: 21.76 KG/M2 | WEIGHT: 152 LBS | HEART RATE: 73 BPM | DIASTOLIC BLOOD PRESSURE: 64 MMHG

## 2018-07-25 DIAGNOSIS — Z95.828 PORT CATHETER IN PLACE: ICD-10-CM

## 2018-07-25 DIAGNOSIS — Z01.818 PREOP GENERAL PHYSICAL EXAM: Primary | ICD-10-CM

## 2018-07-25 DIAGNOSIS — L02.92 BOIL: ICD-10-CM

## 2018-07-25 DIAGNOSIS — L57.0 ACTINIC KERATOSIS: ICD-10-CM

## 2018-07-25 LAB
ERYTHROCYTE [DISTWIDTH] IN BLOOD BY AUTOMATED COUNT: 13.2 % (ref 10–15)
HCT VFR BLD AUTO: 40.6 % (ref 40–53)
HGB BLD-MCNC: 13.8 G/DL (ref 13.3–17.7)
MCH RBC QN AUTO: 31.6 PG (ref 26.5–33)
MCHC RBC AUTO-ENTMCNC: 34 G/DL (ref 31.5–36.5)
MCV RBC AUTO: 93 FL (ref 78–100)
PLATELET # BLD AUTO: 156 10E9/L (ref 150–450)
RBC # BLD AUTO: 4.37 10E12/L (ref 4.4–5.9)
WBC # BLD AUTO: 4.1 10E9/L (ref 4–11)

## 2018-07-25 PROCEDURE — 99214 OFFICE O/P EST MOD 30 MIN: CPT | Performed by: PHYSICIAN ASSISTANT

## 2018-07-25 PROCEDURE — 36415 COLL VENOUS BLD VENIPUNCTURE: CPT | Performed by: PHYSICIAN ASSISTANT

## 2018-07-25 PROCEDURE — 85027 COMPLETE CBC AUTOMATED: CPT | Performed by: PHYSICIAN ASSISTANT

## 2018-07-25 RX ORDER — CEPHALEXIN 500 MG/1
500 CAPSULE ORAL 4 TIMES DAILY
Qty: 28 CAPSULE | Refills: 0 | Status: ON HOLD | OUTPATIENT
Start: 2018-07-25 | End: 2018-07-27

## 2018-07-25 NOTE — MR AVS SNAPSHOT
After Visit Summary   7/25/2018    Daniele Koroma    MRN: [id number]           Patient Information     Date Of Birth          1960        Visit Information        Provider Department      7/25/2018 3:30 PM Felicity Lee PA-C [facility]        Today's Diagnoses     Preop general physical exam    -  1    Port catheter in place        Actinic keratosis        Boil          Care Instructions      Before Your Surgery      Call your surgeon if there is any change in your health. This includes signs of a cold or flu (such as a sore throat, runny nose, cough, rash or fever).    Do not smoke, drink alcohol or take over the counter medicine (unless your surgeon or primary care doctor tells you to) for the 24 hours before and after surgery.    If you take prescribed drugs: Follow your doctor s orders about which medicines to take and which to stop until after surgery.    Eating and drinking prior to surgery: follow the instructions from your surgeon    Take a shower or bath the night before surgery. Use the soap your surgeon gave you to gently clean your skin. If you do not have soap from your surgeon, use your regular soap. Do not shave or scrub the surgery site.  Wear clean pajamas and have clean sheets on your bed.           Follow-ups after your visit        Additional Services     SKIN CARE REFERRAL       Your provider has referred you to: [facility]: [facility] [phone] (Dr. Karsten Gardner Jr.)   [email or web address]  [facility]: [facility] [phone]  [email or web address]     Please be aware that coverage of these services is subject to the terms and limitations of your health insurance plan.  Please check with your insurance prior to the appointment to ensure appropriate coverage for any services considered cosmetic in nature or not medically  "necessary.    Please bring the following with you to your appointment:    (1) Any X-Rays, CTs or MRIs which have been performed.  Contact the facility where they were done to arrange for  prior to your scheduled appointment.  Any new CT, MRI or other procedures ordered by your specialist must be performed at a [facility] facility or coordinated by your clinic's referral office.  (2) List of current medications  (3) This referral request   (4) Any documents/labs given to you for this referral                  Your next 10 appointments already scheduled     Jul 27, 2018   Procedure with Fritz Pirece MD   [facility] (--)    [address]  [facility] [address] [phone]            REMOVE PORT VASCULAR ACCESS .              Who to contact     If you have questions or need follow up information about today's clinic visit or your schedule please contact [facility] directly at [phone].  Normal or non-critical lab and imaging results will be communicated to you by MyChart, letter or phone within 4 business days after the clinic has received the results. If you do not hear from us within 7 days, please contact the clinic through MyChart or phone. If you have a critical or abnormal lab result, we will notify you by phone as soon as possible.  Submit refill requests through [employer] or call your pharmacy and they will forward the refill request to us. Please allow 3 business days for your refill to be completed.          Additional Information About Your Visit        Care EveryWhere ID     This is your Care EveryWhere ID. This could be used by other organizations to access your [facility] medical records  [id number]        Your Vitals Were     Pulse Temperature Respirations Height Pulse Oximetry BMI (Body Mass Index)    73 97  F (36.1  C) (Tympanic) 14 5' 10\" (1.778 m) 96% 21.81 kg/m2       Blood Pressure from Last 3 Encounters: "   07/25/18 122/64   02/20/18 (!) 127/96   09/16/17 122/80    Weight from Last 3 Encounters:   07/25/18 152 lb (68.9 kg)   09/16/17 152 lb (68.9 kg)   08/22/17 153 lb (69.4 kg)              We Performed the Following     CBC with platelets     SKIN CARE REFERRAL          Today's Medication Changes          These changes are accurate as of 7/25/18 11:59 PM.  If you have any questions, ask your nurse or doctor.               Start taking these medicines.        Dose/Directions    cephALEXin 500 MG capsule   Commonly known as:  KEFLEX   Used for:  Boil   Started by:  Felicity Lee PA-C        Dose:  500 mg   Take 1 capsule (500 mg) by mouth 4 times daily for 7 days   Quantity:  28 capsule   Refills:  0         Stop taking these medicines if you haven't already. Please contact your care team if you have questions.     oxyCODONE IR 5 MG tablet   Commonly known as:  ROXICODONE   Stopped by:  Felicity Lee PA-C                Where to get your medicines      These medications were sent to [id number] [address] [address] [facility] [place] [address]     Phone:  [phone]     cephALEXin 500 MG capsule                Primary Care Provider Office Phone # Fax #    Jennifer Rodriguez [phone] [address] [facility] [address]        Equal Access to Services     GUIDO HELM AH: Hadii omega fu hadasho Soshanali, waaxda luqadaha, qaybta kaalmada adeegozila, waxay jenny bruno adewillow garcia. So [facility] [phone].    ATENCIÓN: Si habla español, tiene a quiles disposición servicios gratuitos de asistencia lingüística. Amrit al [phone].    We comply with applicable federal civil rights laws and [place] laws. We do not discriminate on the basis of race, color, national origin, age, disability, sex, sexual orientation, or gender identity.            Thank you!     Thank you for choosing [facility]  [facility]  for your care. Our goal is always to provide you with excellent care. Hearing back from our patients is one way we can continue to improve our services. Please take a few minutes to complete the written survey that you may receive in the mail after your visit with us. Thank you!             Your Updated Medication List - Protect others around you: Learn how to safely use, store and throw away your medicines at www.disposemymeds.org.          This list is accurate as of 7/25/18 11:59 PM.  Always use your most recent med list.                   Brand Name Dispense Instructions for use Diagnosis    cephALEXin 500 MG capsule    KEFLEX    28 capsule    Take 1 capsule (500 mg) by mouth 4 times daily for 7 days    Boil       IBUPROFEN PO      Take 600 mg by mouth every 4 hours as needed for moderate pain        LORAZEPAM PO      Take 0.5 mg by mouth every 4 hours as needed for anxiety or nausea (with chemo)        OLANZAPINE PO      Take 5 mg by mouth daily        order for DME     1 Device    Equipment being ordered: Other: ostomy supplies 1 or 2 piece barrier and pouch, deodorizing drops and spray; ostomy rings, ostomy paste Treatment Diagnosis: Rectal cancer    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

## 2018-07-25 NOTE — PROGRESS NOTES
[facility]  [address]  [facility] [address]  [phone]  Dept: [phone]    PRE-OP EVALUATION:  Today's date: 2018    Daniele Koroma (: 1960) presents for pre-operative evaluation assessment as requested by Dr. Fritz Pierce.  He requires evaluation and anesthesia risk assessment prior to undergoing surgery/procedure for treatment of port removal.    Proposed Surgery/ Procedure: port removal  Date of Surgery/ Procedure: 18  Time of Surgery/ Procedure: 7:20  AM  Hospital/Surgical Facility:   Fax number for surgical facility:   Primary Physician: Jennifer Rodriguez  Type of Anesthesia Anticipated: MAC    Patient has a Health Care Directive or Living Will:  NO    1. NO - Do you have a history of heart attack, stroke, stent, bypass or surgery on an artery in the head, neck, heart or legs?  2. NO - Do you ever have any pain or discomfort in your chest?  3. NO - Do you have a history of  Heart Failure?  4. NO - Are you troubled by shortness of breath when: walking on the level, up a slight hill or at night?  5. NO - Do you currently have a cold, bronchitis or other respiratory infection?  6. NO - Do you have a cough, shortness of breath or wheezing?  7. NO - Do you sometimes get pains in the calves of your legs when you walk?  8. NO - Do you or anyone in your family have previous history of blood clots?  9. NO - Do you or does anyone in your family have a serious bleeding problem such as prolonged bleeding following surgeries or cuts?  10. NO - Have you ever had problems with anemia or been told to take iron pills?  11. YES - Have you had any abnormal blood loss such as black, tarry or bloody stools, or abnormal vaginal bleeding?-3 years ago, history of rectal cancer.  12. NO - Have you ever had a blood transfusion?  13. NO - Have you or any of your relatives ever had problems with anesthesia?  14. NO - Do you have sleep apnea,  excessive snoring or daytime drowsiness?  15. NO - Do you have any prosthetic heart valves?  16. NO - Do you have prosthetic joints?  17. NO - Is there any chance that you may be pregnant?      HPI:     HPI related to upcoming procedure: Port removal      See problem list for active medical problems.  Problems all longstanding and stable, except as noted/documented.  See ROS for pertinent symptoms related to these conditions.                                                                                                                                                          .    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)      Colostomy in place (H)      GERD (gastroesophageal reflux disease)      Hyperlipidemia      Malignant neoplasm of lower lobe of right lung (H)      Nodule of right lung      Noninfectious ileitis      Noninfectious ileitis      Rectal bleeding since 2009     Rectal cancer (H)      Past Surgical History:   Procedure Laterality Date     COLONOSCOPY N/A 6/3/2015    Procedure: COMBINED COLONOSCOPY, SINGLE OR MULTIPLE BIOPSY/POLYPECTOMY BY BIOPSY;  Surgeon: Sparkle Weber MD;  Location:  GI     COMBINED CYSTOSCOPY, INSERT CATHETER URETER Bilateral 10/14/2015    Procedure: COMBINED CYSTOSCOPY, INSERT CATHETER URETER;  Surgeon: Nathaniel Chang MD;  Location:  OR     DAVINCI COLECTOMY N/A 10/14/2015    Procedure: DAVINCI XI COLECTOMY;  Surgeon: Shana Alvarado,  MD;  Location: [facility] OR     DAVINCI LOBECTOMY LUNG Right 7/22/2016    Procedure: DAVINCI XI LOBECTOMY LUNG;  Surgeon: Fritz Pierce MD;  Location: [facility] OR     INCISION AND DRAINAGE RECTUM, COMBINED N/A 11/4/2015    Procedure: COMBINED INCISION AND DRAINAGE RECTUM;  Surgeon: Bradley Regalado MD;  Location:  OR     INSERT PORT VASCULAR ACCESS N/A 11/6/2015    Procedure: INSERT PORT VASCULAR ACCESS;  Surgeon: Fritz Pierce MD;  Location:  OR     INSERT PORT VASCULAR ACCESS N/A 9/12/2016    Procedure: INSERT PORT VASCULAR ACCESS;  Surgeon: Fritz Pierce MD;  Location:  [facility]     INSERT PORT VASCULAR ACCESS N/A 6/9/2017    Procedure: INSERT PORT VASCULAR ACCESS;  PORT PLACEMENT WITH FLAT PLATE XRAY ;  Surgeon: Fritz Pierce MD;  Location:  [facility]     port removed      x2     THORACOSCOPIC WEDGE RESECTION LUNG Right 8/22/2017    Procedure: THORACOSCOPIC WEDGE RESECTION LUNG;  RIGHT VIDEO ASSISTED THORACOSCOPY, RIGHT UPPER LOBE LUNG NODULE;  Surgeon: Fritz Pierce MD;  Location:  OR     Current Outpatient Prescriptions   Medication Sig Dispense Refill     cephALEXin (KEFLEX) 500 MG capsule Take 1 capsule (500 mg) by mouth 4 times daily for 7 days 28 capsule 0     IBUPROFEN PO Take 600 mg by mouth every 4 hours as needed for moderate pain       LORAZEPAM PO Take 0.5 mg by mouth every 4 hours as needed for anxiety or nausea (with chemo)       OLANZAPINE PO Take 5 mg by mouth daily       order for DME Equipment being ordered: Other: ostomy supplies 1 or 2 piece barrier and pouch, deodorizing drops and spray; ostomy rings, ostomy paste  Treatment Diagnosis: Rectal cancer 1 Device 3     PROCHLORPERAZINE MALEATE PO Take 10 mg by mouth every 6 hours as needed for nausea or vomiting       OTC products: None, except as noted above    No Known Allergies   Latex Allergy: NO    Social History   Substance Use Topics     Smoking status: Never Smoker     Smokeless tobacco: Never Used  "    Alcohol use 0.0 oz/week     0 Standard drinks or equivalent per week      Comment: varies 0 - 10 beers     History   Drug Use     Yes     Special: Marijuana     Comment: pot before chemo       REVIEW OF SYSTEMS:   CONSTITUTIONAL: NEGATIVE for fever, chills, change in weight  INTEGUMENTARY/SKIN: POSITIVE for pruritis and lesions on forearms and boil on back  EYES: NEGATIVE for vision changes or irritation  ENT/MOUTH: NEGATIVE for ear, mouth and throat problems  RESP: NEGATIVE for significant cough or SOB  BREAST: NEGATIVE for masses, tenderness or discharge  CV: NEGATIVE for chest pain, palpitations or peripheral edema  GI: NEGATIVE for nausea, abdominal pain, heartburn, or change in bowel habits  : NEGATIVE for frequency, dysuria, or hematuria  MUSCULOSKELETAL: NEGATIVE for significant arthralgias or myalgia  NEURO: NEGATIVE for weakness, dizziness or paresthesias  ENDOCRINE: NEGATIVE for temperature intolerance, skin/hair changes  HEME: NEGATIVE for bleeding problems  PSYCHIATRIC: NEGATIVE for changes in mood or affect    EXAM:   /64  Pulse 73  Temp 97  F (36.1  C) (Tympanic)  Resp 14  Ht 5' 10\" (1.778 m)  Wt 152 lb (68.9 kg)  SpO2 96%  BMI 21.81 kg/m2    GENERAL APPEARANCE: healthy, alert and no distress     EYES: EOMI,  PERRL     HENT: ear canals and TM's normal and nose and mouth without ulcers or lesions     NECK: no adenopathy, no asymmetry, masses, or scars and thyroid normal to palpation     RESP: lungs clear to auscultation - no rales, rhonchi or wheezes     CV: regular rates and rhythm, normal S1 S2, no S3 or S4 and no murmur, click or rub     ABDOMEN:  soft, nontender, no HSM or masses and bowel sounds normal     MS: extremities normal- no gross deformities noted, no evidence of inflammation in joints, FROM in all extremities.     SKIN: keratoses - actinic bilateral forearms, left>right, 1.5 cm boil on lower back at midline with purulent discharge.     NEURO: Normal strength and tone, " sensory exam grossly normal, mentation intact and speech normal     PSYCH: mentation appears normal. and affect normal/bright     LYMPHATICS: No cervical adenopathy    DIAGNOSTICS:     EKG: Not indicated due to non-vascular surgery and low risk of event (age <65 and without cardiac risk factors)  Labs Resulted Today:   Results for orders placed or performed in visit on 07/25/18   CBC with platelets   Result Value Ref Range    WBC 4.1 4.0 - 11.0 10e9/L    RBC Count 4.37 (L) 4.4 - 5.9 10e12/L    Hemoglobin 13.8 13.3 - 17.7 g/dL    Hematocrit 40.6 40.0 - 53.0 %    MCV 93 78 - 100 fl    MCH 31.6 26.5 - 33.0 pg    MCHC 34.0 31.5 - 36.5 g/dL    RDW 13.2 10.0 - 15.0 %    Platelet Count 156 150 - 450 10e9/L       Recent Labs   Lab Test  02/20/18   1420  09/16/17   1018  08/22/17   0820  08/10/16   07/22/16   1235   HGB  13.2*  14.1  14.0   < >   --    < >  14.3   PLT  165  190  165   < >   --    < >  142*   INR  1.04   --   0.96   < >   --    --   1.04   NA   --    --   141   --    --    --   139   POTASSIUM   --    --   3.7   --   4.0   --   4.8   CR   --    --   0.82   --   0.73   --   0.84    < > = values in this interval not displayed.        IMPRESSION:   Reason for surgery/procedure: Port removal  Diagnosis/reason for consult: Evaluation and anesthesia risk assessment prior to port removal      The proposed surgical procedure is considered LOW risk.    REVISED CARDIAC RISK INDEX  The patient has the following serious cardiovascular risks for perioperative complications such as (MI, PE, VFib and 3  AV Block):  No serious cardiac risks  INTERPRETATION: 0 risks: Class I (very low risk - 0.4% complication rate)    The patient has the following additional risks for perioperative complications:  No identified additional risks      ICD-10-CM    1. Preop general physical exam Z01.818 CBC with platelets   2. Port catheter in place Z95.828    3. Actinic keratosis L57.0 SKIN CARE REFERRAL   4. Boil L02.92 cephALEXin (KEFLEX) 500  MG capsule       RECOMMENDATIONS:       --Patient is to take all scheduled medications on the day of surgery.    APPROVAL GIVEN to proceed with proposed procedure, without further diagnostic evaluation       Signed Electronically by: Felicity Lee PA-C    Copy of this evaluation report is provided to requesting physician.    [facility] Preop Guidelines    Revised Cardiac Risk Index      Will follow up with dermatology for extensive actinic keratoses.  There are too many to freeze today.  They may need to treat him with 5FU.    Discussed warm compresses and antibiotics for boil.  If not resolved after antibiotic course, pt will call and put in referral for general surgery to remove.

## 2018-07-26 NOTE — H&P (VIEW-ONLY)
[facility]  [address]  [facility] [address]  [phone]  Dept: [phone]    PRE-OP EVALUATION:  Today's date: 2018    Daniele Koroma (: 1960) presents for pre-operative evaluation assessment as requested by Dr. Fritz Pierce.  He requires evaluation and anesthesia risk assessment prior to undergoing surgery/procedure for treatment of port removal.    Proposed Surgery/ Procedure: port removal  Date of Surgery/ Procedure: 18  Time of Surgery/ Procedure: 7:20  AM  Hospital/Surgical Facility:   Fax number for surgical facility:   Primary Physician: Jennifer Rodriguez  Type of Anesthesia Anticipated: MAC    Patient has a Health Care Directive or Living Will:  NO    1. NO - Do you have a history of heart attack, stroke, stent, bypass or surgery on an artery in the head, neck, heart or legs?  2. NO - Do you ever have any pain or discomfort in your chest?  3. NO - Do you have a history of  Heart Failure?  4. NO - Are you troubled by shortness of breath when: walking on the level, up a slight hill or at night?  5. NO - Do you currently have a cold, bronchitis or other respiratory infection?  6. NO - Do you have a cough, shortness of breath or wheezing?  7. NO - Do you sometimes get pains in the calves of your legs when you walk?  8. NO - Do you or anyone in your family have previous history of blood clots?  9. NO - Do you or does anyone in your family have a serious bleeding problem such as prolonged bleeding following surgeries or cuts?  10. NO - Have you ever had problems with anemia or been told to take iron pills?  11. YES - Have you had any abnormal blood loss such as black, tarry or bloody stools, or abnormal vaginal bleeding?-3 years ago, history of rectal cancer.  12. NO - Have you ever had a blood transfusion?  13. NO - Have you or any of your relatives ever had problems with anesthesia?  14. NO - Do you have sleep apnea,  excessive snoring or daytime drowsiness?  15. NO - Do you have any prosthetic heart valves?  16. NO - Do you have prosthetic joints?  17. NO - Is there any chance that you may be pregnant?      HPI:     HPI related to upcoming procedure: Port removal      See problem list for active medical problems.  Problems all longstanding and stable, except as noted/documented.  See ROS for pertinent symptoms related to these conditions.                                                                                                                                                          .    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)      Colostomy in place (H)      GERD (gastroesophageal reflux disease)      Hyperlipidemia      Malignant neoplasm of lower lobe of right lung (H)      Nodule of right lung      Noninfectious ileitis      Noninfectious ileitis      Rectal bleeding since 2009     Rectal cancer (H)      Past Surgical History:   Procedure Laterality Date     COLONOSCOPY N/A 6/3/2015    Procedure: COMBINED COLONOSCOPY, SINGLE OR MULTIPLE BIOPSY/POLYPECTOMY BY BIOPSY;  Surgeon: Sparkle Weber MD;  Location:  GI     COMBINED CYSTOSCOPY, INSERT CATHETER URETER Bilateral 10/14/2015    Procedure: COMBINED CYSTOSCOPY, INSERT CATHETER URETER;  Surgeon: Nathaniel Chang MD;  Location:  OR     DAVINCI COLECTOMY N/A 10/14/2015    Procedure: DAVINCI XI COLECTOMY;  Surgeon: Shana Alvarado,  MD;  Location: [facility] OR     DAVINCI LOBECTOMY LUNG Right 7/22/2016    Procedure: DAVINCI XI LOBECTOMY LUNG;  Surgeon: Fritz Pierce MD;  Location: [facility] OR     INCISION AND DRAINAGE RECTUM, COMBINED N/A 11/4/2015    Procedure: COMBINED INCISION AND DRAINAGE RECTUM;  Surgeon: Bradley Regalado MD;  Location:  OR     INSERT PORT VASCULAR ACCESS N/A 11/6/2015    Procedure: INSERT PORT VASCULAR ACCESS;  Surgeon: Fritz Pierce MD;  Location:  OR     INSERT PORT VASCULAR ACCESS N/A 9/12/2016    Procedure: INSERT PORT VASCULAR ACCESS;  Surgeon: Fritz Pierce MD;  Location:  [facility]     INSERT PORT VASCULAR ACCESS N/A 6/9/2017    Procedure: INSERT PORT VASCULAR ACCESS;  PORT PLACEMENT WITH FLAT PLATE XRAY ;  Surgeon: Fritz Pierce MD;  Location:  [facility]     port removed      x2     THORACOSCOPIC WEDGE RESECTION LUNG Right 8/22/2017    Procedure: THORACOSCOPIC WEDGE RESECTION LUNG;  RIGHT VIDEO ASSISTED THORACOSCOPY, RIGHT UPPER LOBE LUNG NODULE;  Surgeon: Fritz Pierce MD;  Location:  OR     Current Outpatient Prescriptions   Medication Sig Dispense Refill     cephALEXin (KEFLEX) 500 MG capsule Take 1 capsule (500 mg) by mouth 4 times daily for 7 days 28 capsule 0     IBUPROFEN PO Take 600 mg by mouth every 4 hours as needed for moderate pain       LORAZEPAM PO Take 0.5 mg by mouth every 4 hours as needed for anxiety or nausea (with chemo)       OLANZAPINE PO Take 5 mg by mouth daily       order for DME Equipment being ordered: Other: ostomy supplies 1 or 2 piece barrier and pouch, deodorizing drops and spray; ostomy rings, ostomy paste  Treatment Diagnosis: Rectal cancer 1 Device 3     PROCHLORPERAZINE MALEATE PO Take 10 mg by mouth every 6 hours as needed for nausea or vomiting       OTC products: None, except as noted above    No Known Allergies   Latex Allergy: NO    Social History   Substance Use Topics     Smoking status: Never Smoker     Smokeless tobacco: Never Used  "    Alcohol use 0.0 oz/week     0 Standard drinks or equivalent per week      Comment: varies 0 - 10 beers     History   Drug Use     Yes     Special: Marijuana     Comment: pot before chemo       REVIEW OF SYSTEMS:   CONSTITUTIONAL: NEGATIVE for fever, chills, change in weight  INTEGUMENTARY/SKIN: POSITIVE for pruritis and lesions on forearms and boil on back  EYES: NEGATIVE for vision changes or irritation  ENT/MOUTH: NEGATIVE for ear, mouth and throat problems  RESP: NEGATIVE for significant cough or SOB  BREAST: NEGATIVE for masses, tenderness or discharge  CV: NEGATIVE for chest pain, palpitations or peripheral edema  GI: NEGATIVE for nausea, abdominal pain, heartburn, or change in bowel habits  : NEGATIVE for frequency, dysuria, or hematuria  MUSCULOSKELETAL: NEGATIVE for significant arthralgias or myalgia  NEURO: NEGATIVE for weakness, dizziness or paresthesias  ENDOCRINE: NEGATIVE for temperature intolerance, skin/hair changes  HEME: NEGATIVE for bleeding problems  PSYCHIATRIC: NEGATIVE for changes in mood or affect    EXAM:   /64  Pulse 73  Temp 97  F (36.1  C) (Tympanic)  Resp 14  Ht 5' 10\" (1.778 m)  Wt 152 lb (68.9 kg)  SpO2 96%  BMI 21.81 kg/m2    GENERAL APPEARANCE: healthy, alert and no distress     EYES: EOMI,  PERRL     HENT: ear canals and TM's normal and nose and mouth without ulcers or lesions     NECK: no adenopathy, no asymmetry, masses, or scars and thyroid normal to palpation     RESP: lungs clear to auscultation - no rales, rhonchi or wheezes     CV: regular rates and rhythm, normal S1 S2, no S3 or S4 and no murmur, click or rub     ABDOMEN:  soft, nontender, no HSM or masses and bowel sounds normal     MS: extremities normal- no gross deformities noted, no evidence of inflammation in joints, FROM in all extremities.     SKIN: keratoses - actinic bilateral forearms, left>right, 1.5 cm boil on lower back at midline with purulent discharge.     NEURO: Normal strength and tone, " sensory exam grossly normal, mentation intact and speech normal     PSYCH: mentation appears normal. and affect normal/bright     LYMPHATICS: No cervical adenopathy    DIAGNOSTICS:     EKG: Not indicated due to non-vascular surgery and low risk of event (age <65 and without cardiac risk factors)  Labs Resulted Today:   Results for orders placed or performed in visit on 07/25/18   CBC with platelets   Result Value Ref Range    WBC 4.1 4.0 - 11.0 10e9/L    RBC Count 4.37 (L) 4.4 - 5.9 10e12/L    Hemoglobin 13.8 13.3 - 17.7 g/dL    Hematocrit 40.6 40.0 - 53.0 %    MCV 93 78 - 100 fl    MCH 31.6 26.5 - 33.0 pg    MCHC 34.0 31.5 - 36.5 g/dL    RDW 13.2 10.0 - 15.0 %    Platelet Count 156 150 - 450 10e9/L       Recent Labs   Lab Test  02/20/18   1420  09/16/17   1018  08/22/17   0820  08/10/16   07/22/16   1235   HGB  13.2*  14.1  14.0   < >   --    < >  14.3   PLT  165  190  165   < >   --    < >  142*   INR  1.04   --   0.96   < >   --    --   1.04   NA   --    --   141   --    --    --   139   POTASSIUM   --    --   3.7   --   4.0   --   4.8   CR   --    --   0.82   --   0.73   --   0.84    < > = values in this interval not displayed.        IMPRESSION:   Reason for surgery/procedure: Port removal  Diagnosis/reason for consult: Evaluation and anesthesia risk assessment prior to port removal      The proposed surgical procedure is considered LOW risk.    REVISED CARDIAC RISK INDEX  The patient has the following serious cardiovascular risks for perioperative complications such as (MI, PE, VFib and 3  AV Block):  No serious cardiac risks  INTERPRETATION: 0 risks: Class I (very low risk - 0.4% complication rate)    The patient has the following additional risks for perioperative complications:  No identified additional risks      ICD-10-CM    1. Preop general physical exam Z01.818 CBC with platelets   2. Port catheter in place Z95.828    3. Actinic keratosis L57.0 SKIN CARE REFERRAL   4. Boil L02.92 cephALEXin (KEFLEX) 500  MG capsule       RECOMMENDATIONS:       --Patient is to take all scheduled medications on the day of surgery.    APPROVAL GIVEN to proceed with proposed procedure, without further diagnostic evaluation       Signed Electronically by: Felicity Lee PA-C    Copy of this evaluation report is provided to requesting physician.    [place] Preop Guidelines    Revised Cardiac Risk Index      Will follow up with dermatology for extensive actinic keratoses.  There are too many to freeze today.  They may need to treat him with 5FU.    Discussed warm compresses and antibiotics for boil.  If not resolved after antibiotic course, pt will call and put in referral for general surgery to remove.

## 2018-07-27 ENCOUNTER — ANESTHESIA (OUTPATIENT)
Dept: SURGERY | Facility: CLINIC | Age: 58
End: 2018-07-27
Payer: COMMERCIAL

## 2018-07-27 ENCOUNTER — ANESTHESIA EVENT (OUTPATIENT)
Dept: SURGERY | Facility: CLINIC | Age: 58
End: 2018-07-27
Payer: COMMERCIAL

## 2018-07-27 ENCOUNTER — SURGERY (OUTPATIENT)
Age: 58
End: 2018-07-27

## 2018-07-27 ENCOUNTER — HOSPITAL ENCOUNTER (OUTPATIENT)
Facility: CLINIC | Age: 58
Discharge: HOME | End: 2018-07-27
Attending: THORACIC SURGERY (CARDIOTHORACIC VASCULAR SURGERY) | Admitting: THORACIC SURGERY (CARDIOTHORACIC VASCULAR SURGERY)
Payer: COMMERCIAL

## 2018-07-27 VITALS
RESPIRATION RATE: 16 BRPM | OXYGEN SATURATION: 98 % | WEIGHT: 151.6 LBS | DIASTOLIC BLOOD PRESSURE: 71 MMHG | HEIGHT: 70 IN | BODY MASS INDEX: 21.7 KG/M2 | TEMPERATURE: 96 F | SYSTOLIC BLOOD PRESSURE: 104 MMHG

## 2018-07-27 DIAGNOSIS — C20: Primary | ICD-10-CM

## 2018-07-27 DIAGNOSIS — C77.5: Primary | ICD-10-CM

## 2018-07-27 DIAGNOSIS — G89.18 ACUTE POST-OPERATIVE PAIN: ICD-10-CM

## 2018-07-27 PROCEDURE — 71000027 ZZH RECOVERY PHASE 2 EACH 15 MINS: Performed by: THORACIC SURGERY (CARDIOTHORACIC VASCULAR SURGERY)

## 2018-07-27 PROCEDURE — 25000125 ZZHC RX 250: Performed by: THORACIC SURGERY (CARDIOTHORACIC VASCULAR SURGERY)

## 2018-07-27 PROCEDURE — 37000008 ZZH ANESTHESIA TECHNICAL FEE, 1ST 30 MIN: Performed by: THORACIC SURGERY (CARDIOTHORACIC VASCULAR SURGERY)

## 2018-07-27 PROCEDURE — 37000009 ZZH ANESTHESIA TECHNICAL FEE, EACH ADDTL 15 MIN: Performed by: THORACIC SURGERY (CARDIOTHORACIC VASCULAR SURGERY)

## 2018-07-27 PROCEDURE — 25000128 H RX IP 250 OP 636: Performed by: NURSE ANESTHETIST, CERTIFIED REGISTERED

## 2018-07-27 PROCEDURE — 36000050 ZZH SURGERY LEVEL 2 1ST 30 MIN: Performed by: THORACIC SURGERY (CARDIOTHORACIC VASCULAR SURGERY)

## 2018-07-27 PROCEDURE — 25000128 H RX IP 250 OP 636: Performed by: ANESTHESIOLOGY

## 2018-07-27 PROCEDURE — 27210995 ZZH RX 272: Performed by: THORACIC SURGERY (CARDIOTHORACIC VASCULAR SURGERY)

## 2018-07-27 PROCEDURE — 40000170 ZZH STATISTIC PRE-PROCEDURE ASSESSMENT II: Performed by: THORACIC SURGERY (CARDIOTHORACIC VASCULAR SURGERY)

## 2018-07-27 PROCEDURE — 71000012 ZZH RECOVERY PHASE 1 LEVEL 1 FIRST HR: Performed by: THORACIC SURGERY (CARDIOTHORACIC VASCULAR SURGERY)

## 2018-07-27 PROCEDURE — 27210794 ZZH OR GENERAL SUPPLY STERILE: Performed by: THORACIC SURGERY (CARDIOTHORACIC VASCULAR SURGERY)

## 2018-07-27 RX ORDER — FENTANYL CITRATE 50 UG/ML
INJECTION, SOLUTION INTRAMUSCULAR; INTRAVENOUS PRN
Status: DISCONTINUED | OUTPATIENT
Start: 2018-07-27 | End: 2018-07-27

## 2018-07-27 RX ORDER — MAGNESIUM HYDROXIDE 1200 MG/15ML
LIQUID ORAL PRN
Status: DISCONTINUED | OUTPATIENT
Start: 2018-07-27 | End: 2018-07-27 | Stop reason: HOSPADM

## 2018-07-27 RX ORDER — NALOXONE HYDROCHLORIDE 0.4 MG/ML
.1-.4 INJECTION, SOLUTION INTRAMUSCULAR; INTRAVENOUS; SUBCUTANEOUS
Status: DISCONTINUED | OUTPATIENT
Start: 2018-07-27 | End: 2018-07-27 | Stop reason: HOSPADM

## 2018-07-27 RX ORDER — SODIUM CHLORIDE, SODIUM LACTATE, POTASSIUM CHLORIDE, CALCIUM CHLORIDE 600; 310; 30; 20 MG/100ML; MG/100ML; MG/100ML; MG/100ML
INJECTION, SOLUTION INTRAVENOUS CONTINUOUS
Status: DISCONTINUED | OUTPATIENT
Start: 2018-07-27 | End: 2018-07-27 | Stop reason: HOSPADM

## 2018-07-27 RX ORDER — MEPERIDINE HYDROCHLORIDE 25 MG/ML
12.5 INJECTION INTRAMUSCULAR; INTRAVENOUS; SUBCUTANEOUS
Status: DISCONTINUED | OUTPATIENT
Start: 2018-07-27 | End: 2018-07-27 | Stop reason: HOSPADM

## 2018-07-27 RX ORDER — FENTANYL CITRATE 50 UG/ML
25-50 INJECTION, SOLUTION INTRAMUSCULAR; INTRAVENOUS
Status: DISCONTINUED | OUTPATIENT
Start: 2018-07-27 | End: 2018-07-27 | Stop reason: HOSPADM

## 2018-07-27 RX ORDER — ONDANSETRON 4 MG/1
4 TABLET, ORALLY DISINTEGRATING ORAL EVERY 30 MIN PRN
Status: DISCONTINUED | OUTPATIENT
Start: 2018-07-27 | End: 2018-07-27 | Stop reason: HOSPADM

## 2018-07-27 RX ORDER — IBUPROFEN 600 MG/1
600 TABLET, FILM COATED ORAL
Status: DISCONTINUED | OUTPATIENT
Start: 2018-07-27 | End: 2018-07-27 | Stop reason: HOSPADM

## 2018-07-27 RX ORDER — HYDROMORPHONE HYDROCHLORIDE 1 MG/ML
.3-.5 INJECTION, SOLUTION INTRAMUSCULAR; INTRAVENOUS; SUBCUTANEOUS EVERY 10 MIN PRN
Status: DISCONTINUED | OUTPATIENT
Start: 2018-07-27 | End: 2018-07-27 | Stop reason: HOSPADM

## 2018-07-27 RX ORDER — HYDROCODONE BITARTRATE AND ACETAMINOPHEN 5; 325 MG/1; MG/1
1 TABLET ORAL
Status: DISCONTINUED | OUTPATIENT
Start: 2018-07-27 | End: 2018-07-27 | Stop reason: HOSPADM

## 2018-07-27 RX ORDER — PROPOFOL 10 MG/ML
INJECTION, EMULSION INTRAVENOUS PRN
Status: DISCONTINUED | OUTPATIENT
Start: 2018-07-27 | End: 2018-07-27

## 2018-07-27 RX ORDER — HYDROCODONE BITARTRATE AND ACETAMINOPHEN 5; 325 MG/1; MG/1
1 TABLET ORAL EVERY 6 HOURS PRN
Qty: 5 TABLET | Refills: 0 | Status: SHIPPED | OUTPATIENT
Start: 2018-07-27

## 2018-07-27 RX ORDER — ONDANSETRON 2 MG/ML
4 INJECTION INTRAMUSCULAR; INTRAVENOUS EVERY 30 MIN PRN
Status: DISCONTINUED | OUTPATIENT
Start: 2018-07-27 | End: 2018-07-27 | Stop reason: HOSPADM

## 2018-07-27 RX ORDER — LIDOCAINE HYDROCHLORIDE 10 MG/ML
INJECTION, SOLUTION INFILTRATION; PERINEURAL
Status: DISCONTINUED
Start: 2018-07-27 | End: 2018-07-27 | Stop reason: HOSPADM

## 2018-07-27 RX ADMIN — PROPOFOL 20 MG: 10 INJECTION, EMULSION INTRAVENOUS at 07:26

## 2018-07-27 RX ADMIN — LIDOCAINE HYDROCHLORIDE 5 ML: 10 INJECTION, SOLUTION EPIDURAL; INFILTRATION; INTRACAUDAL; PERINEURAL at 07:30

## 2018-07-27 RX ADMIN — SODIUM CHLORIDE 1000 ML: 900 IRRIGANT IRRIGATION at 07:30

## 2018-07-27 RX ADMIN — FENTANYL CITRATE 100 MCG: 50 INJECTION, SOLUTION INTRAMUSCULAR; INTRAVENOUS at 07:20

## 2018-07-27 RX ADMIN — SODIUM CHLORIDE, POTASSIUM CHLORIDE, SODIUM LACTATE AND CALCIUM CHLORIDE: 600; 310; 30; 20 INJECTION, SOLUTION INTRAVENOUS at 07:20

## 2018-07-27 RX ADMIN — MIDAZOLAM 2 MG: 1 INJECTION INTRAMUSCULAR; INTRAVENOUS at 07:20

## 2018-07-27 RX ADMIN — PROPOFOL 40 MG: 10 INJECTION, EMULSION INTRAVENOUS at 07:23

## 2018-07-27 NOTE — ANESTHESIA POSTPROCEDURE EVALUATION
Patient: Daniele Koroma    Procedure(s):  PORT REMOVAL  - Wound Class: I-Clean    Diagnosis:RECTAL CANCER   Diagnosis Additional Information: No value filed.    Anesthesia Type:  MAC    Note:  Anesthesia Post Evaluation    Patient location during evaluation: PACU  Patient participation: Able to fully participate in evaluation  Level of consciousness: awake  Pain management: adequate  Airway patency: patent  Cardiovascular status: acceptable  Respiratory status: acceptable  Hydration status: acceptable  PONV: none     Anesthetic complications: None          Last vitals:  Vitals:    07/27/18 0800 07/27/18 0815 07/27/18 0925   BP: 102/70 97/71 104/71   Resp: 12 12 16   Temp:      SpO2: 96% 98% 98%         Electronically Signed By: Roya Barnett MD  July 27, 2018  11:33 AM

## 2018-07-27 NOTE — IP AVS SNAPSHOT
[facility]    [address]    [place] [address]    Phone:  [phone]    Fax:  [phone]                                       After Visit Summary   7/27/2018    Daniele Koroma    MRN: [id number]           After Visit Summary Signature Page     I have received my discharge instructions, and my questions have been answered. I have discussed any challenges I see with this plan with the nurse or doctor.    ..........................................................................................................................................  Patient/Patient Representative Signature      ..........................................................................................................................................  Patient Representative Print Name and Relationship to Patient    ..................................................               ................................................  Date                                            Time    ..........................................................................................................................................  Reviewed by Signature/Title    ...................................................              ..............................................  Date                                                            Time

## 2018-07-27 NOTE — ANESTHESIA CARE TRANSFER NOTE
Patient: Daniele Koroma    Procedure(s):  PORT REMOVAL  - Wound Class: I-Clean    Diagnosis: RECTAL CANCER   Diagnosis Additional Information: No value filed.    Anesthesia Type:   MAC     Note:  Airway :Room Air  Patient transferred to:PACU  Comments: Pt exhibits spont resps, all monitors and alarms on in pacu, report given to RN, vss.Handoff Report: Identifed the Patient, Identified the Reponsible Provider, Reviewed the pertinent medical history, Discussed the surgical course, Reviewed Intra-OP anesthesia mangement and issues during anesthesia, Set expectations for post-procedure period and Allowed opportunity for questions and acknowledgement of understanding      Vitals: (Last set prior to Anesthesia Care Transfer)    CRNA VITALS  7/27/2018 0710 - 7/27/2018 0749      7/27/2018             NIBP: 92/60    Pulse: 67    NIBP Mean: 75    Ht Rate: 67    SpO2: 100 %    Resp Rate (set): 10                Electronically Signed By: ONLAN Marx CRNA  July 27, 2018  7:49 AM

## 2018-07-27 NOTE — IP AVS SNAPSHOT
MRN:[id number]                      After Visit Summary   7/27/2018    Daniele Koroma    MRN: [id number]           Thank you!     Thank you for choosing [facility] for your care. Our goal is always to provide you with excellent care. Hearing back from our patients is one way we can continue to improve our services. Please take a few minutes to complete the written survey that you may receive in the mail after you visit with us. Thank you!        Patient Information     Date Of Birth          1960        About your hospital stay     You were admitted on:  July 27, 2018 You last received care in the:  [facility]    You were discharged on:  July 27, 2018       Who to Call     For medical emergencies, please call 911.  For non-urgent questions about your medical care, please call your primary care provider or clinic, [phone]  For questions related to your surgery, please call your surgery clinic        Attending Provider     Provider Specialty    Fritz Pierce MD Thoracic Diseases       Primary Care Provider Office Phone # Fax #    Jennifer Doris Rodriguez [phone]      After Care Instructions     Diet Instructions       Resume pre-procedure diet            Discharge Instructions       Follow up appointment as instructed by Surgeon and or RN            Do not - immerse incision in water until sutures removed       Do not immerse incision in water/swim for one week.            Dressing       Keep clear Dermabond glue in place.  It will peel off on its own after about 7-10 days.            Shower       No shower for 24 hours post procedure. May shower Postoperative Day (POD)  one                  Further instructions from your care team       Same Day Surgery Discharge Instructions for  Sedation and General Anesthesia       It's not unusual to feel dizzy, light-headed or faint for up to 24 hours after surgery or while taking pain  medication.  If you have these symptoms: sit for a few minutes before standing and have someone assist you when you get up to walk or use the bathroom.      You should rest and relax for the next 24 hours. We recommend you make arrangements to have an adult stay with you for at least 24 hours after your discharge.  Avoid hazardous and strenuous activity.      DO NOT DRIVE any vehicle or operate mechanical equipment for 24 hours following the end of your surgery.  Even though you may feel normal, your reactions may be affected by the medication you have received.      Do not drink alcoholic beverages for 24 hours following surgery.       Slowly progress to your regular diet as you feel able. It's not unusual to feel nauseated and/or vomit after receiving anesthesia.  If you develop these symptoms, drink clear liquids (apple juice, ginger ale, broth, 7-up, etc. ) until you feel better.  If your nausea and vomiting persists for 24 hours, please notify your surgeon.        All narcotic pain medications, along with inactivity and anesthesia, can cause constipation. Drinking plenty of liquids and increasing fiber intake will help.      For any questions of a medical nature, call your surgeon.      Do not make important decisions for 24 hours.      If you had general anesthesia, you may have a sore throat for a couple of days related to the breathing tube used during surgery.  You may use Cepacol lozenges to help with this discomfort.  If it worsens or if you develop a fever, contact your surgeon.       If you feel your pain is not well managed with the pain medications prescribed by your surgeon, please contact your surgeon's office to let them know so they can address your concerns.         Reasons to contact your surgeon:    1. Signs of possible infection: Check your incision daily for redness, swelling, warmth, red streaks or foul drainage.   2. Elevated temperature.  3. Pain not controlled with pain medication and/or  "rest.   4. Uncontrolled nausea or vomiting.  5. Any questions or concerns.        Pending Results     No orders found from 7/25/2018 to 7/28/2018.            Admission Information     Date & Time Provider Department Dept. Phone    7/27/2018 Fritz Pierce MD [facility] [phone]      Your Vitals Were     Blood Pressure Temperature Respirations Height Weight Pulse Oximetry    110/72 96  F (35.6  C) (Temporal) 12 1.778 m (5' 10\") 68.8 kg (151 lb 9.6 oz) 96%    BMI (Body Mass Index)                   21.75 kg/m2           Care EveryWhere ID     This is your Care EveryWhere ID. This could be used by other organizations to access your [facility] medical records  [id number]        Equal Access to Services     CATALINA HELM AH: Delbert vazquez Sojena, wacarlos luqadaha, qaybta kaalmada adewillowyagabriella, eileen garcia. So [facility] [place] [phone].    ATENCIÓN: Si habla español, tiene a quiles disposición servicios gratuitos de asistencia lingüística. Llame al [phone].    We comply with applicable federal civil rights laws and [place] laws. We do not discriminate on the basis of race, color, national origin, age, disability, sex, sexual orientation, or gender identity.               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), Acute post-operative pain        Dose:  1 tablet   Take 1 tablet by mouth every 6 hours as needed for moderate to severe pain or other (Moderate to Severe Pain)   Quantity:  5 tablet   Refills:  0         CONTINUE these medicines which have NOT CHANGED        Dose / Directions    order for DME   Used for:  Rectal cancer metastasised to intrapelvic lymph node, Colostomy in place (H)        Equipment being ordered: Other: ostomy supplies 1 or 2 piece barrier and pouch, " deodorizing drops and spray; ostomy rings, ostomy paste Treatment Diagnosis: Rectal cancer   Quantity:  1 Device   Refills:  3            Where to get your medicines      Some of these will need a paper prescription and others can be bought over the counter. Ask your nurse if you have questions.     Bring a paper prescription for each of these medications     HYDROcodone-acetaminophen 5-325 MG per tablet                Protect others around you: Learn how to safely use, store and throw away your medicines at www.disposemymeds.org.        Information about OPIOIDS     PRESCRIPTION OPIOIDS: WHAT YOU NEED TO KNOW   We gave you an opioid (narcotic) pain medicine. It is important to manage your pain, but opioids are not always the best choice. You should first try all the other options your care team gave you. Take this medicine for as short a time (and as few doses) as possible.     These medicines have risks:    DO NOT drive when on new or higher doses of pain medicine. These medicines can affect your alertness and reaction times, and you could be arrested for driving under the influence (DUI). If you need to use opioids long-term, talk to your care team about driving.    DO NOT operate heave machinery    DO NOT do any other dangerous activities while taking these medicines.     DO NOT drink any alcohol while taking these medicines.      If the opioid prescribed includes acetaminophen, DO NOT take with any other medicines that contain acetaminophen. Read all labels carefully. Look for the word  acetaminophen  or  Tylenol.  Ask your pharmacist if you have questions or are unsure.    You can get addicted to pain medicines, especially if you have a history of addiction (chemical, alcohol or substance dependence). Talk to your care team about ways to reduce this risk.    Store your pills in a secure place, locked if possible. We will not replace any lost or stolen medicine. If you don t finish your medicine, please throw  away (dispose) as directed by your pharmacist. The [place] Pollution Control Agency has more information about safe disposal: https://www.pca.[facility].[place].us/living-green/managing-unwanted-medications.     All opioids tend to cause constipation. Drink plenty of water and eat foods that have a lot of fiber, such as fruits, vegetables, prune juice, apple juice and high-fiber cereal. Take a laxative (Miralax, milk of magnesia, Colace, Senna) if you don t move your bowels at least every other day.              Medication List: This is a list of all your medications and when to take them. Check marks below indicate your daily home schedule. Keep this list as a reference.      Medications           Morning Afternoon Evening Bedtime As Needed    HYDROcodone-acetaminophen 5-325 MG per tablet   Commonly known as:  NORCO   Take 1 tablet by mouth every 6 hours as needed for moderate to severe pain or other (Moderate to Severe Pain)                                order for DME   Equipment being ordered: Other: ostomy supplies 1 or 2 piece barrier and pouch, deodorizing drops and spray; ostomy rings, ostomy paste Treatment Diagnosis: Rectal cancer

## 2018-07-27 NOTE — ANESTHESIA PREPROCEDURE EVALUATION
Anesthesia Evaluation     . Pt has had prior anesthetic.     No history of anesthetic complications          ROS/MED HX    ENT/Pulmonary: Comment: S/p partial lung excision 8/2017     (-) sleep apnea   Neurologic:     (+)neuropathy     Cardiovascular:  - neg cardiovascular ROS       METS/Exercise Tolerance:  >4 METS   Hematologic:         Musculoskeletal:         GI/Hepatic: Comment: adenoCA with mets to liver and lungs    (+) GERD Asymptomatic on medication,       Renal/Genitourinary:         Endo:      (-) Type I DM   Psychiatric:         Infectious Disease:         Malignancy:   (+) Malignancy History of GI  GI CA status post Chemo and Surgery,         Other:                     Physical Exam  Normal systems: dental    Airway   Mallampati: I  TM distance: >3 FB  Neck ROM: full    Dental     Cardiovascular   Rhythm and rate: regular and normal      Pulmonary    breath sounds clear to auscultation                    Anesthesia Plan      History & Physical Review  History and physical reviewed and following examination; no interval change.    ASA Status:  3 .    NPO Status:  > 8 hours    Plan for MAC with Intravenous induction. Reason for MAC:  Deep or markedly invasive procedure (G8)  PONV prophylaxis:  Ondansetron (or other 5HT-3)       Postoperative Care      Consents  Anesthetic plan, risks, benefits and alternatives discussed with:  Patient..                          .

## 2018-07-27 NOTE — OP NOTE
DATE OF PROCEDURE:07/27/2018      SURGEON:  Fritz Pierce MD       FIRST ASSISTANT:  Meme Layton PA-C       PREOPERATIVE DIAGNOSIS:  colon cancer      POSTOPERATIVE DIAGNOSIS:  Same       PROCEDURE:  Removal of PowerPort implanted port, left internal jugular vein.       ANESTHESIA:  Local with lidocaine 1% without epinephrine and sedation.       INDICATIONS:  Patient had a port placed for treatment.  Therapy has now been completed and a PowerPort was no longer necessary.       DESCRIPTION OF PROCEDURE:  The patient was brought to the OR and placed in supine position.  IV sedation was given.  The left infraclavicular area was prepared and draped in the usual fashion using ChloraPrep.  Local anesthesia was done with lidocaine 1% without epinephrine.  The infraclavicular incision was opened.  The port was dissected from the subcutaneous pocket and port and the catheter were removed.  The catheter was intact.  The tract of the catheter was closed with a figure-of-eight of 3-0 Vicryl.  Hemostasis was excellent.  Incision was closed in the usual fashion.  Estimated blood loss minimal.  Sponge and needle counts correct.           Fritz Pierce MD [facility]

## 2019-05-14 ENCOUNTER — OFFICE VISIT (OUTPATIENT)
Dept: URGENT CARE | Facility: URGENT CARE | Age: 59
End: 2019-05-14
Payer: COMMERCIAL

## 2019-05-14 VITALS
DIASTOLIC BLOOD PRESSURE: 86 MMHG | WEIGHT: 156 LBS | BODY MASS INDEX: 22.38 KG/M2 | TEMPERATURE: 98.4 F | HEART RATE: 82 BPM | SYSTOLIC BLOOD PRESSURE: 131 MMHG | OXYGEN SATURATION: 96 %

## 2019-05-14 DIAGNOSIS — H69.93 DYSFUNCTION OF BOTH EUSTACHIAN TUBES: Primary | ICD-10-CM

## 2019-05-14 DIAGNOSIS — L02.818 CUTANEOUS ABSCESS OF OTHER SITE: ICD-10-CM

## 2019-05-14 PROCEDURE — 87070 CULTURE OTHR SPECIMN AEROBIC: CPT | Performed by: PHYSICIAN ASSISTANT

## 2019-05-14 PROCEDURE — 99214 OFFICE O/P EST MOD 30 MIN: CPT | Performed by: PHYSICIAN ASSISTANT

## 2019-05-14 RX ORDER — SULFAMETHOXAZOLE/TRIMETHOPRIM 800-160 MG
1 TABLET ORAL 2 TIMES DAILY
Qty: 20 TABLET | Refills: 0 | Status: SHIPPED | OUTPATIENT
Start: 2019-05-14 | End: 2019-05-24

## 2019-05-14 RX ORDER — FLUTICASONE PROPIONATE 50 MCG
2 SPRAY, SUSPENSION (ML) NASAL DAILY
Qty: 16 G | Refills: 0 | Status: SHIPPED | OUTPATIENT
Start: 2019-05-14

## 2019-05-14 RX ORDER — CETIRIZINE HYDROCHLORIDE 10 MG/1
10 TABLET ORAL EVERY EVENING
Qty: 30 TABLET | Refills: 0 | Status: SHIPPED | OUTPATIENT
Start: 2019-05-14

## 2019-05-14 NOTE — PATIENT INSTRUCTIONS
(H69.83) Dysfunction of both eustachian tubes  (primary encounter diagnosis)  Comment:   Plan: fluticasone (FLONASE) 50 MCG/ACT nasal spray,         cetirizine (ZYRTEC) 10 MG tablet            (L02.818) Cutaneous abscess of other site  Comment:   Plan: sulfamethoxazole-trimethoprim (BACTRIM         DS/SEPTRA DS) 800-160 MG tablet  DERMATOLOGY REFERRAL              Hot pack to area for 20 minutes 3-4 times a day for the first couple of days.      Follow up with derm    DO NOT PICK AT THE WOUND

## 2019-05-14 NOTE — PROGRESS NOTES
Patient presents with:  Urgent Care: Pt states  tick bite on back, bilateral  hearing loss sxs 6x weeks     SUBJECTIVE:  Daniele Koroma is a 59 year old male who presents to the clinic today for:  1) a possible bite ? On his lower back, onset a few days ago.   2) decreased hearing over the past 6 weeks.  No trauma.    Does complain of runny nose and intermittent sneezing.      Has had a raised bump on the same area of his back in the past and it drained and healed on its own.      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)      Colostomy in place (H)      GERD (gastroesophageal reflux disease)      Hyperlipidemia      Malignant neoplasm of lower lobe of right lung (H)      Nodule of right lung      Noninfectious ileitis      Rectal bleeding since 2009     Rectal cancer (H)       No Known Allergies  Social History     Tobacco Use     Smoking status: Never Smoker     Smokeless tobacco: Never Used   Substance Use Topics     Alcohol use: Yes     Alcohol/week: 0.0 oz     Comment: varies 0 - 10 beers       ROS:  CONSTITUTIONAL:NEGATIVE for fever, chills, change in weight  INTEGUMENTARY/SKIN: as per HPI  EYES: NEGATIVE for vision changes or irritation  ENT/MOUTH: as per HPI  RESP:NEGATIVE for significant cough or SOB  CV: NEGATIVE for chest pain, palpitations or peripheral edema  GI: NEGATIVE for nausea, abdominal pain, heartburn, or change in bowel habits  MUSCULOSKELETAL: NEGATIVE for significant arthralgias or myalgia  Review of systems negative except as stated above.    EXAM:   /86   Pulse 82   Temp 98.4  F (36.9  C) (Oral)   Wt 70.8 kg (156 lb)   SpO2 96%   BMI 22.38 kg/m    GENERAL: alert, no acute distress.  SKIN: on back, lumbar region, right of vertebrae.  Open wound 3-4mm in diameter with purulent material draining from lesion.    This is adjacent to an open comedone nearer to the vertebrae.   Drainage was cultured.  Wound cleaned and  covered with non stick dressing in clinic.   GENERAL APPEARANCE: healthy, alert and no distress  EYES: EOMI,  PERRL, conjunctiva clear  HENT: ear canals and TM's normal.  Nose with boggy blue turbinates and clear coryza.  Both TM's are retracted.  Mouth without ulcers, erythema or lesions  NECK: supple, non-tender to palpation, no adenopathy noted  RESP: lungs clear to auscultation - no rales, rhonchi or wheezes  CV: regular rates and rhythm, normal S1 S2, no murmur noted    (H69.83) Dysfunction of both eustachian tubes  (primary encounter diagnosis)  Comment:   Plan: fluticasone (FLONASE) 50 MCG/ACT nasal spray,         cetirizine (ZYRTEC) 10 MG tablet          Follow up with primary clinic for re-check within 2-3 weeks, sooner should symptoms worsen or persist.      (L02.818) Cutaneous abscess of other site  Comment:   Plan: sulfamethoxazole-trimethoprim (BACTRIM         DS/SEPTRA DS) 800-160 MG tablet, DERMATOLOGY         REFERRAL, Wound Culture Aerobic Bacterial            Hot pack to area for 20 minutes 3-4 times a day for the first couple of days.      Follow up with derm    DO NOT PICK AT THE WOUND    Patient expresses understanding and agreement with the assessment and plan as above.

## 2019-05-17 LAB
BACTERIA SPEC CULT: NO GROWTH
Lab: NORMAL
SPECIMEN SOURCE: NORMAL

## 2020-10-27 ENCOUNTER — VIRTUAL VISIT (OUTPATIENT)
Dept: FAMILY MEDICINE | Facility: OTHER | Age: 60
End: 2020-10-27

## 2020-10-27 NOTE — PROGRESS NOTES
"Date: 10/27/2020 10:42:26  Clinician: Maria Stewart  Clinician NPI: [id number]  Patient: Daniele Koroma  Patient : 1960  Patient Address: [facility] [place] [address]  Patient Phone: [phone]  Visit Protocol: URI  Patient Summary:  Daniele is a 60 year old ( : 1960 ) male who initiated a [facility] Visit for COVID-19 (Coronavirus) evaluation and screening. When asked the question \"Please sign me up to receive news, health information and promotions. \", Daniele responded \"No\".    Daniele states his symptoms started gradually 3-4 days ago.   His symptoms consist of rhinitis.   Symptom details   Nasal secretions: The color of his mucus is clear.   Daniele denies having ear pain, headache, wheezing, fever, enlarged lymph nodes, cough, nasal congestion, anosmia, vomiting, nausea, facial pain or pressure, myalgias, chills, malaise, sore throat, teeth pain, ageusia, and diarrhea. He also denies double sickening (worsening symptoms after initial improvement), taking antibiotic medication in the past month, and having recent facial or sinus surgery in the past 60 days. He is not experiencing dyspnea.    Pertinent COVID-19 (Coronavirus) information  In the past 14 days, Daniele has not worked in a congregate living setting.   He does not work or volunteer as healthcare worker or a  and does not work or volunteer in a healthcare facility.   Daniele also has not lived in a congregate living setting in the past 14 days. He does not live with a healthcare worker.   Daniele has had a close contact with a laboratory-confirmed COVID-19 patient within 14 days of symptom onset. Additional information about contact with COVID-19 (Coronavirus) patient as reported by the patient (free text): An employee at work tested positive last Friday.  We work together each morning for a short time.   Since 2019, Daniele and has not had upper respiratory infection or influenza-like illness. Has not been diagnosed with " lab-confirmed COVID-19 test   Pertinent medical history  Daniele does not need a return to work/school note.   Weight: 160 lbs   Daniele does not smoke or use smokeless tobacco.   Additional information as reported by the patient (free text): 2 1/2 year cancer survivor.  Stage 4 rectal-down to about a lung and one half because of tumors and surgery   Weight: 160 lbs    MEDICATIONS: No current medications, ALLERGIES: NKDA  Clinician Response:  Dear Daniele,  Based on the information provided, you have a viral upper respiratory infection, otherwise known as a cold. Symptoms vary from person to person, but can include sneezing, coughing, a runny nose, sore throat, and headache and range from mild to severe.  Unfortunately, there are no medications that can cure a cold, so treatment is focused on controlling symptoms as much as possible. Most people gradually feel better until symptoms are gone in 1-2 weeks.  Medication information  Because you have a viral infection, antibiotics will not help you get better. Treating a viral infection with antibiotics could actually make you feel worse.  Self care  Steps you can take to be as comfortable as possible:     Rest.    Drink plenty of fluids.    Take a warm shower to loosen congestion    Use a cool-mist humidifier.     When to seek care  Please be seen in a clinic or urgent care if any of the following occur:   New symptoms develop, or symptoms become worse   Call ahead before going to the clinic or urgent care.  Additional treatment plan   Your symptoms show that you may have coronavirus (COVID-19). This illness can cause fever, cough and trouble breathing. Many people get a mild case and get better on their own. Some people can get very sick.  Based on the symptoms you have shared, I would like you to be re-checked in 2 to 3 days. Please call your family clinic to set up a video or phone visit.  Will I be tested for COVID-19?  We would like to test you for this virus.   Please  "call [phone] to schedule your visit. Explain that you were referred by [facility] to have a COVID-19 test. Be ready to share your [facility] visit ID number.  Please note that if you are assessed for Covid-19 testing and receive an order for testing from [facility], that the scheduling of your Covid test at [facility] may be delayed by three or four days or more due to limited availability for testing. Additional options for testing can be found on the [place] Covid-19 Response website. https://[place].gov/covid19/     The following will serve as your written order for this COVID Test, ordered by me, for the indication of suspected COVID [Z20.828]: The test will be ordered in [email or web address], our electronic health record, after you are scheduled. It will show as ordered and authorized by Neymar Anguiano MD.  Order: COVID-19 (Coronavirus) PCR for SYMPTOMATIC testing from [facility].   1.When it's time for your COVID test:   Stay at least 6 feet away from others. (If someone will drive you to your test, stay in the backseat, as far away from the  as you can.)   Cover your mouth and nose with a mask, tissue or washcloth.  Go straight to the testing site. Don't make any stops on the way there or back.      2.Starting now: Stay home and away from others (self-isolate) until:   You've had no fever---and no medicine that reduces fever---for one full day (24 hours). And...   Your other symptoms have gotten better. For example, your cough or breathing has improved. And...   At least 10 days have passed since your symptoms started.       During this time, don't leave the house except for testing or medical care.   Stay in your own room, even for meals. Use your own bathroom if you can.   Stay away from others in your home. No hugging, kissing or shaking hands. No visitors.  Don't go to work, school or anywhere else.    Clean \"high touch\" surfaces often (doorknobs, counters, handles, etc.). Use a household cleaning spray or wipes. You'll find " a full list of  on the EPA website: www.epa.gov/pesticide-registration/list-n-disinfectants-use-against-sars-cov-2.   Cover your mouth and nose with a mask, tissue or washcloth to avoid spreading germs.  Wash your hands and face often. Use soap and water.  Caregivers in these groups are at risk for severe illness due to COVID-19:  o People 65 years and older  o People who live in a nursing home or long-term care facility  o People with chronic disease (lung, heart, cancer, diabetes, kidney, liver, immunologic)   o People who have a weakened immune system, including those who:   Are in cancer treatment  Take medicine that weakens the immune system, such as corticosteroids  Had a bone marrow or organ transplant  Have an immune deficiency  Have poorly controlled HIV or AIDS  Are obese (body mass index of 40 or higher)  Smoke regularly   o Caregivers should wear gloves while washing dishes, handling laundry and cleaning bedrooms and bathrooms.  o Use caution when washing and drying laundry: Don't shake dirty laundry, and use the warmest water setting that you can.  o For more tips, go to www.cdc.gov/coronavirus/2019-ncov/downloads/10Things.pdf.      How can I take care of myself?   Get lots of rest. Drink extra fluids (unless a doctor has told you not to)   Take Tylenol (acetaminophen) for fever or pain. If you have liver or kidney problems, ask your family doctor if it's okay to take Tylenol.   Adults can take either:    650 mg (two 325 mg pills) every 4 to 6 hours, or...   1,000 mg (two 500 mg pills) every 8 hours as needed.    Note: Don't take more than 3,000 mg in one day. Acetaminophen is found in many medicines (both prescribed and over-the-counter medicines). Read all labels to be sure you don't take too much.   For children, check the Tylenol bottle for the right dose. The dose is based on the child's age or weight.    If you have other health problems (like cancer, heart failure, an organ transplant or  severe kidney disease): Call your specialty clinic if you don't feel better in the next 2 days.       Know when to call 911. Emergency warning signs include:    Trouble breathing or shortness of breath Pain or pressure in the chest that doesn't go away Feeling confused like you haven't felt before, or not being able to wake up Bluish-colored lips or face  Where can I get more information?   [facility] -- About COVID-19: [email or web address]   CDC -- What to Do If You're Sick: www.cdc.gov/coronavirus/2019-ncov/about/steps-when-sick.html   CDC -- Ending Home Isolation: www.cdc.gov/coronavirus/2019-ncov/hcp/disposition-in-home-patients.html   [facility] -- Caring for Someone: www.cdc.gov/coronavirus/2019-ncov/if-you-are-sick/care-for-someone.html   [facility] -- Interim Guidance for Hospital Discharge to Home: www.[facility].[place]/diseases/coronavirus/hcp/hospdischarge.pdf   [facility] clinical trials (COVID-19 research studies): clinicalaffairs.[facility]-clinical-trials    Below are the COVID-19 hotlines at the [facility] of Health ([facility]). Interpreters are available.    For health questions: Call 632-714-9694 or 1-147.809.1334 (7 a.m. to 7 p.m.) For questions about schools and childcare: Call 897-599-8418 or 1-468.467.3577 (7 a.m. to 7 p.m.)       Diagnosis: Contact with and (suspected) exposure to other viral communicable diseases  Diagnosis ICD: Z20.828

## 2022-05-06 ENCOUNTER — OFFICE VISIT (OUTPATIENT)
Dept: URGENT CARE | Facility: URGENT CARE | Age: 62
End: 2022-05-06
Payer: COMMERCIAL

## 2022-05-06 ENCOUNTER — ANCILLARY PROCEDURE (OUTPATIENT)
Dept: GENERAL RADIOLOGY | Facility: CLINIC | Age: 62
End: 2022-05-06
Attending: FAMILY MEDICINE
Payer: COMMERCIAL

## 2022-05-06 VITALS
TEMPERATURE: 98 F | HEART RATE: 63 BPM | SYSTOLIC BLOOD PRESSURE: 122 MMHG | DIASTOLIC BLOOD PRESSURE: 82 MMHG | OXYGEN SATURATION: 96 %

## 2022-05-06 DIAGNOSIS — S89.80XA: ICD-10-CM

## 2022-05-06 DIAGNOSIS — S89.92XA KNEE INJURY, LEFT, INITIAL ENCOUNTER: Primary | ICD-10-CM

## 2022-05-06 PROCEDURE — 99213 OFFICE O/P EST LOW 20 MIN: CPT | Performed by: FAMILY MEDICINE

## 2022-05-06 PROCEDURE — 73562 X-RAY EXAM OF KNEE 3: CPT | Mod: TC | Performed by: RADIOLOGY

## 2022-05-06 NOTE — PROGRESS NOTES
Chief Complaint   Patient presents with     Knee Pain     Patient was at  the  dog park yesterday and dog R into his L knee injuring it.      Hernia     Patient is having hernia issues that are ongoing from a long time ago and would like to ask questions about that.        ASSESMENT AND PLAN   Daniele was seen today for knee pain and hernia.    Diagnoses and all orders for this visit:    Knee injury, left, initial encounter  -     XR Knee Left 3 Views    Injury of collateral ligament of knee  -     acetaminophen-codeine (TYLENOL #3) 300-30 MG tablet; Take 1 tablet by mouth every 8 hours as needed for severe pain  -     Orthopedic  Referral; Future        Tylenol, Ibuprofen, Rest and knee immobilizer    doing quad exercises  Apply ice to the area several times a day  If symptoms do not get better next 7 days should follow-up with Ortho    Initial differential diagnosis included but was not restricted to   Differential Diagnosis:  MS Injury Pain: sprain, fracture, tendonitis, muscle strain, contusion and dislocation  Medical Decision Making:  As there is no fracture noted I feel conservative treatment management would help with symptoms.  Patient was suggested to continue icing to help with the soft tissue inflammation.  Can do ibuprofen no more than 2 to 3 days with food    Routine discharge counseling was given to the patient and the patient understands that worsening, changing or persistent symptoms should prompt an immediate call or follow up with their primary physician or the emergency department. The importance of appropriate follow up was also discussed with the patient.     I have reviewed the nursing notes.  Review of the result(s) of each unique test -   Results for orders placed or performed in visit on 05/06/22   XR Knee Left 3 Views     Status: None    Narrative    LEFT KNEE THREE VIEWS    5/6/2022 3:31 PM     HISTORY: Left knee pain medially. Knee injury, left, initial  encounter.    COMPARISON:  None.      Impression    IMPRESSION: Joint spaces appear fairly well-preserved. No acute  fracture or significant joint effusion. Small ossicle projects  adjacent to the posterior lip of the tibial plateau and could  represent a soft tissue calcification or small loose body.     KAY FISCHER MD         SYSTEM ID:  [id number]         Time  spent on the date of the encounter doing chart review, review of test results, interpretation of tests, patient visit and documentation   see orders in Epic  Pt verbalized and agreed with the plan and is aware of the worsening symptoms for which would need to follow up .  Pt was stable during time of discharge from the clinic     SUBJECTIVE     Daniele Koroma is a 62 year old male presenting with a chief complaint of    Chief Complaint   Patient presents with     Knee Pain     Patient was at  the  [employer] yesterday and dog R into his L knee injuring it.      Hernia     Patient is having hernia issues that are ongoing from a long time ago and would like to ask questions about that.      MS Injury/Pain    Onset of symptoms was 1 day(s) ago.  Location: left knee  Context:       The injury happened while at dog park a big dog came running and bumped against the lateral aspect of the left knee   While getting hurt he bent his knee causing severe pain mostly in the medial aspect of the knee.  Has been unable to bend the knee completely.  Mechanism: trauma:       Patient experienced immediate pain, delayed pain  Course of symptoms is worsening.    Severity moderate  Current and Associated symptoms: Pain, Tenderness and Decreased range of motion  Denies  Warmth and Redness  Aggravating Factors: walking and weight-bearing  Therapies to improve symptoms include: ibuprofen  This is the first time this type of problem has occurred for this patient.           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)      Colostomy in place (H)       GERD (gastroesophageal reflux disease)      Hyperlipidemia      Malignant neoplasm of lower lobe of right lung (H)      Nodule of right lung      Noninfectious ileitis      Rectal bleeding since 2009     Rectal cancer (H)      Current Outpatient Medications   Medication Sig Dispense Refill     acetaminophen-codeine (TYLENOL #3) 300-30 MG tablet Take 1 tablet by mouth every 8 hours as needed for severe pain 10 tablet 0     cetirizine (ZYRTEC) 10 MG tablet Take 1 tablet (10 mg) by mouth every evening (Patient not taking: Reported on 5/6/2022) 30 tablet 0     fluticasone (FLONASE) 50 MCG/ACT nasal spray Spray 2 sprays into both nostrils daily (Patient not taking: Reported on 5/6/2022) 16 g 0     HYDROcodone-acetaminophen (NORCO) 5-325 MG per tablet Take 1 tablet by mouth every 6 hours as needed for moderate to severe pain or other (Moderate to Severe Pain) (Patient not taking: No sig reported) 5 tablet 0     order for DME Equipment being ordered: Other: ostomy supplies 1 or 2 piece barrier and pouch, deodorizing drops and spray; ostomy rings, ostomy paste  Treatment Diagnosis: Rectal cancer (Patient not taking: No sig reported) 1 Device 3     Social History     Tobacco Use     Smoking status: Never Smoker     Smokeless tobacco: Never Used   Substance Use Topics     Alcohol use: Yes     Alcohol/week: 0.0 standard drinks     Comment: varies 0 - 10 beers     Family History   Problem Relation Age of Onset     Unknown/Adopted Mother      Unknown/Adopted Father      Lung Cancer Brother      Lung Cancer Brother      Diabetes No family hx of      Coronary Artery Disease No family hx of      Hypertension No family hx of      Hyperlipidemia No family hx of      Cerebrovascular Disease No family hx of      Breast Cancer No family hx of      Colon Cancer No family hx of      Prostate Cancer No family hx of      Other Cancer No family hx of      Depression No family hx of      Anxiety Disorder No family hx of      Mental  Illness No family hx of      Substance Abuse No family hx of      Anesthesia Reaction No family hx of      Asthma No family hx of      Osteoporosis No family hx of      Genetic Disorder No family hx of      Thyroid Disease No family hx of      Obesity No family hx of      Glaucoma No family hx of      Macular Degeneration No family hx of          ROS:    10 point ROS of systems including Constitutional, Eyes, Respiratory, Cardiovascular, Gastroenterology, Genitourinary, Integumentary,Psychiatric ,neurological were all negative except for pertinent positives noted in my HPI         OBJECTIVE:    /82 (BP Location: Right arm, Patient Position: Sitting, Cuff Size: Adult Small)   Pulse 63   Temp 98  F (36.7  C) (Oral)   SpO2 96%   GENERAL APPEARANCE: healthy, alert and no distress  EYES: EOMI,  PERRL, conjunctiva clear  MS: left side knee swelling, tenderness to palpation over the medial collateral ligament area and decreased ROM   Flexion is limited due to pain can do full extension  PSYCH: mentation appears normal  Physical Exam      (Note was completed, in part, with [employer] voice-recognition software. Documentation reviewed, but some grammatical, spelling, and word errors may remain.)  Shruthi Edwards MD.now.today

## 2022-08-12 NOTE — PROGRESS NOTES
This is a recent snapshot of the patient's [facility] medical record.  For current drug dose and complete information and questions, call [phone] or In Basket pool, [facility] ([id number])  CSN Number:  [id number]       Negative

## 2022-09-09 NOTE — DISCHARGE INSTRUCTIONS
Same Day Surgery Discharge Instructions for  Sedation and General Anesthesia       It's not unusual to feel dizzy, light-headed or faint for up to 24 hours after surgery or while taking pain medication.  If you have these symptoms: sit for a few minutes before standing and have someone assist you when you get up to walk or use the bathroom.      You should rest and relax for the next 24 hours. We recommend you make arrangements to have an adult stay with you for at least 24 hours after your discharge.  Avoid hazardous and strenuous activity.      DO NOT DRIVE any vehicle or operate mechanical equipment for 24 hours following the end of your surgery.  Even though you may feel normal, your reactions may be affected by the medication you have received.      Do not drink alcoholic beverages for 24 hours following surgery.       Slowly progress to your regular diet as you feel able. It's not unusual to feel nauseated and/or vomit after receiving anesthesia.  If you develop these symptoms, drink clear liquids (apple juice, ginger ale, broth, 7-up, etc. ) until you feel better.  If your nausea and vomiting persists for 24 hours, please notify your surgeon.        All narcotic pain medications, along with inactivity and anesthesia, can cause constipation. Drinking plenty of liquids and increasing fiber intake will help.      For any questions of a medical nature, call your surgeon.      Do not make important decisions for 24 hours.      If you had general anesthesia, you may have a sore throat for a couple of days related to the breathing tube used during surgery.  You may use Cepacol lozenges to help with this discomfort.  If it worsens or if you develop a fever, contact your surgeon.       If you feel your pain is not well managed with the pain medications prescribed by your surgeon, please contact your surgeon's office to let them know so they can address your concerns.         Reasons to contact your  surgeon:    1. Signs of possible infection: Check your incision daily for redness, swelling, warmth, red streaks or foul drainage.   2. Elevated temperature.  3. Pain not controlled with pain medication and/or rest.   4. Uncontrolled nausea or vomiting.  5. Any questions or concerns.       no

## (undated) DEVICE — SU VICRYL 4-0 PS-2 18" UND J496H

## (undated) DEVICE — LINEN GOWN OVERSIZE 5408

## (undated) DEVICE — BLADE KNIFE SURG 10 371110

## (undated) DEVICE — STPL ENDO ARTICULATING 60MM EC60A

## (undated) DEVICE — CATH THORACIC 24FR STR SLICONE 14024

## (undated) DEVICE — PREP CHLORAPREP W/ORANGE TINT 10.5ML 260715

## (undated) DEVICE — GLOVE PROTEXIS W/NEU-THERA 6.5  2D73TE65

## (undated) DEVICE — SU SILK 2 REEL 60" SA8H

## (undated) DEVICE — DRAPE BREAST/CHEST 29420

## (undated) DEVICE — GOWN IMPERVIOUS ZONED LG

## (undated) DEVICE — DRSG GAUZE 4X4" 3033

## (undated) DEVICE — TUBING SUCTION MEDI-VAC SOFT 3/16"X20' N520A

## (undated) DEVICE — SU VICRYL 2-0 CT-2 27" J333H

## (undated) DEVICE — ENDO POUCH UNIV RETRIEVAL SYSTEM INZII 10MM CD001

## (undated) DEVICE — LINEN TOWEL PACK X5 5464

## (undated) DEVICE — SYR 03ML LL W/O NDL 309657

## (undated) DEVICE — SUCTION DRY CHEST DRAIN OASIS 3600-100

## (undated) DEVICE — ESU ELEC BLADE 2.75" COATED/INSULATED E1455

## (undated) DEVICE — SU NDL CUT REV MED 3/8 209014

## (undated) DEVICE — ESU ELEC BLADE 6" COATED/INSULATED E1455-6

## (undated) DEVICE — PREP CHLORAPREP 26ML TINTED ORANGE  260815

## (undated) DEVICE — SU VICRYL 1 CT-2 27" J335H

## (undated) DEVICE — ESU GROUND PAD UNIVERSAL W/O CORD

## (undated) DEVICE — ENDO TROCAR THORACIC 10/12MM TT012

## (undated) DEVICE — SU DERMABOND MINI DHVM12

## (undated) DEVICE — PACK MINOR SBA15MIFSE

## (undated) DEVICE — SU SILK 1 TIE 10X30" SA87G

## (undated) DEVICE — SOL NACL 0.9% INJ 250ML BAG 2B1322Q

## (undated) DEVICE — NDL 22GA 1.5"

## (undated) DEVICE — SUCTION CANISTER MEDIVAC LINER 3000ML W/LID 65651-530

## (undated) DEVICE — SYR 10ML SLIP TIP W/O NDL

## (undated) DEVICE — GLOVE PROTEXIS W/NEU-THERA 7.5  2D73TE75

## (undated) DEVICE — SOL NACL 0.9% IRRIG 1000ML BOTTLE 07138-09

## (undated) DEVICE — DECANTER BAG 2002S

## (undated) DEVICE — DRAPE SHEET REV FOLD 3/4 9349

## (undated) DEVICE — SURGICEL HEMOSTAT 3X4" NUKNIT 1943

## (undated) DEVICE — DRAPE POUCH INSTRUMENT 1018

## (undated) DEVICE — TAPE DRSG UNIVERSAL CLOTH 3" WHITE LATEX 881-3

## (undated) DEVICE — DRAPE LAP TRANSVERSE 29421

## (undated) DEVICE — NDL 19GA 1.5"

## (undated) DEVICE — SU VICRYL 3-0 SH 27" J316H

## (undated) DEVICE — ANTIFOG SOLUTION W/FOAM PAD 31142527

## (undated) DEVICE — SYR BULB IRRIG 50ML LATEX FREE 0035280

## (undated) DEVICE — DRAPE IOBAN INCISE 23X17" 6650EZ

## (undated) DEVICE — SOL WATER IRRIG 1000ML BOTTLE 2F7114

## (undated) DEVICE — DECANTER VIAL 2006S

## (undated) DEVICE — STPL RELOAD REG/THK TISSUE ECHELON 60 X 3.8MM GOLD GST60D

## (undated) DEVICE — DRSG KERLIX 4 1/2"X4YDS ROLL 6715

## (undated) DEVICE — BLADE KNIFE SURG 15 371115

## (undated) RX ORDER — FENTANYL CITRATE 50 UG/ML
INJECTION, SOLUTION INTRAMUSCULAR; INTRAVENOUS
Status: DISPENSED
Start: 2017-08-22

## (undated) RX ORDER — PROPOFOL 10 MG/ML
INJECTION, EMULSION INTRAVENOUS
Status: DISPENSED
Start: 2017-06-09

## (undated) RX ORDER — CEFAZOLIN SODIUM 2 G/100ML
INJECTION, SOLUTION INTRAVENOUS
Status: DISPENSED
Start: 2017-08-22

## (undated) RX ORDER — PROPOFOL 10 MG/ML
INJECTION, EMULSION INTRAVENOUS
Status: DISPENSED
Start: 2017-08-22

## (undated) RX ORDER — PROPOFOL 10 MG/ML
INJECTION, EMULSION INTRAVENOUS
Status: DISPENSED
Start: 2018-07-27

## (undated) RX ORDER — CEFAZOLIN SODIUM 2 G/100ML
INJECTION, SOLUTION INTRAVENOUS
Status: DISPENSED
Start: 2017-05-26

## (undated) RX ORDER — FENTANYL CITRATE 50 UG/ML
INJECTION, SOLUTION INTRAMUSCULAR; INTRAVENOUS
Status: DISPENSED
Start: 2017-06-09

## (undated) RX ORDER — BUPIVACAINE HYDROCHLORIDE 5 MG/ML
INJECTION, SOLUTION EPIDURAL; INTRACAUDAL
Status: DISPENSED
Start: 2017-08-22

## (undated) RX ORDER — KETOROLAC TROMETHAMINE 30 MG/ML
INJECTION, SOLUTION INTRAMUSCULAR; INTRAVENOUS
Status: DISPENSED
Start: 2017-08-22

## (undated) RX ORDER — ONDANSETRON 2 MG/ML
INJECTION INTRAMUSCULAR; INTRAVENOUS
Status: DISPENSED
Start: 2017-06-09

## (undated) RX ORDER — CEFAZOLIN SODIUM 2 G/100ML
INJECTION, SOLUTION INTRAVENOUS
Status: DISPENSED
Start: 2018-02-20

## (undated) RX ORDER — FENTANYL CITRATE 50 UG/ML
INJECTION, SOLUTION INTRAMUSCULAR; INTRAVENOUS
Status: DISPENSED
Start: 2018-07-27

## (undated) RX ORDER — CEFAZOLIN SODIUM 1 G/3ML
INJECTION, POWDER, FOR SOLUTION INTRAMUSCULAR; INTRAVENOUS
Status: DISPENSED
Start: 2017-06-09

## (undated) RX ORDER — LIDOCAINE HYDROCHLORIDE 20 MG/ML
INJECTION, SOLUTION EPIDURAL; INFILTRATION; INTRACAUDAL; PERINEURAL
Status: DISPENSED
Start: 2017-06-09